# Patient Record
Sex: FEMALE | Race: WHITE | Employment: OTHER | URBAN - METROPOLITAN AREA
[De-identification: names, ages, dates, MRNs, and addresses within clinical notes are randomized per-mention and may not be internally consistent; named-entity substitution may affect disease eponyms.]

---

## 2018-05-08 ENCOUNTER — HOSPITAL ENCOUNTER (OUTPATIENT)
Dept: GENERAL RADIOLOGY | Age: 83
Discharge: HOME | End: 2018-05-10
Payer: MEDICARE

## 2018-05-08 ENCOUNTER — HOSPITAL ENCOUNTER (OUTPATIENT)
Age: 83
Discharge: HOME | End: 2018-05-10
Payer: MEDICARE

## 2018-05-08 DIAGNOSIS — S23.41XA SPRAIN OF RIBS, INITIAL ENCOUNTER: ICD-10-CM

## 2018-05-08 PROCEDURE — 71101 X-RAY EXAM UNILAT RIBS/CHEST: CPT

## 2019-06-05 ENCOUNTER — HOSPITAL ENCOUNTER (OUTPATIENT)
Age: 84
Discharge: HOME | End: 2019-06-07
Payer: MEDICARE

## 2019-06-05 ENCOUNTER — HOSPITAL ENCOUNTER (OUTPATIENT)
Dept: GENERAL RADIOLOGY | Age: 84
Discharge: HOME | End: 2019-06-07
Payer: MEDICARE

## 2019-06-05 DIAGNOSIS — S93.401A SPRAIN OF RIGHT ANKLE, UNSPECIFIED LIGAMENT, INITIAL ENCOUNTER: ICD-10-CM

## 2019-06-05 PROCEDURE — 73610 X-RAY EXAM OF ANKLE: CPT

## 2019-06-28 ENCOUNTER — HOSPITAL ENCOUNTER (OUTPATIENT)
Dept: GENERAL RADIOLOGY | Age: 84
Discharge: HOME | End: 2019-06-30
Payer: MEDICARE

## 2019-06-28 ENCOUNTER — HOSPITAL ENCOUNTER (OUTPATIENT)
Age: 84
Discharge: HOME | End: 2019-06-30
Payer: MEDICARE

## 2019-06-28 DIAGNOSIS — M25.471 EFFUSION, RIGHT ANKLE: ICD-10-CM

## 2019-06-28 DIAGNOSIS — S93.401A SPRAIN OF UNSPECIFIED LIGAMENT OF RIGHT ANKLE, INITIAL ENCOUNTER: ICD-10-CM

## 2019-06-28 DIAGNOSIS — S93.601A UNSPECIFIED SPRAIN OF RIGHT FOOT, INITIAL ENCOUNTER: ICD-10-CM

## 2019-06-28 PROCEDURE — 73630 X-RAY EXAM OF FOOT: CPT

## 2020-03-16 ENCOUNTER — TELEPHONE (OUTPATIENT)
Dept: GERIATRIC MEDICINE | Age: 85
End: 2020-03-16

## 2020-06-17 ENCOUNTER — INITIAL CONSULT (OUTPATIENT)
Dept: GERIATRIC MEDICINE | Age: 85
End: 2020-06-17
Payer: MEDICARE

## 2020-06-17 VITALS
WEIGHT: 151.8 LBS | TEMPERATURE: 97.6 F | RESPIRATION RATE: 16 BRPM | SYSTOLIC BLOOD PRESSURE: 130 MMHG | HEART RATE: 92 BPM | HEIGHT: 62 IN | DIASTOLIC BLOOD PRESSURE: 78 MMHG | BODY MASS INDEX: 27.94 KG/M2

## 2020-06-17 PROCEDURE — 99212 OFFICE O/P EST SF 10 MIN: CPT

## 2020-06-17 PROCEDURE — 99212 OFFICE O/P EST SF 10 MIN: CPT | Performed by: INTERNAL MEDICINE

## 2020-06-17 RX ORDER — DONEPEZIL HYDROCHLORIDE 5 MG/1
2.5 TABLET, FILM COATED ORAL
Qty: 30 TABLET | Refills: 2 | Status: SHIPPED | OUTPATIENT
Start: 2020-06-17

## 2020-06-17 ASSESSMENT — PATIENT HEALTH QUESTIONNAIRE - PHQ9
SUM OF ALL RESPONSES TO PHQ QUESTIONS 1-9: 0
1. LITTLE INTEREST OR PLEASURE IN DOING THINGS: 0
SUM OF ALL RESPONSES TO PHQ QUESTIONS 1-9: 0
SUM OF ALL RESPONSES TO PHQ9 QUESTIONS 1 & 2: 0
2. FEELING DOWN, DEPRESSED OR HOPELESS: 0

## 2020-06-17 NOTE — PROGRESS NOTES
Here with one of her daughters and she knows all of her kids names   Daughter lives in same house,  Daughter moved back  About a year ago  Lives in same home and [address] [facility] are all good   Started out  2 years ago with gradual changes   Old memory is good  HYM? \"Fine\"  NO DRIVING  IADL's daughter does driving and cooking  Daughter doing pills and helping with bills  ADL's independent   No falls lately   No head injuries , No surgeries   No major surgeries  Sleeps well  ON NO MEDS   Yoni POWELL 1950   Remebered snow 1950   Impression: MCI mild   Plan;Consider Aricept 2.5 mg a day7

## 2021-02-22 ENCOUNTER — TELEPHONE (OUTPATIENT)
Dept: GERIATRIC MEDICINE | Age: 86
End: 2021-02-22

## 2021-02-22 NOTE — TELEPHONE ENCOUNTER
Pt was seen on consult 6/17/20. Had a follow up visit scheduled in December, but cancelled. No further appts scheduled. Daughter, Nazia Quinones called. States she lives with pt, and that she was with pt during the consult visit. No HIPPA form on file. Daughter wants to work remotely from home, & wants to know if Dr Yajaira Oropeza would write a letter indicating pt's diagnosis so she can apply to work remotely -- d/t COVID concerns. States she thinks DR said the diagnosis was mild cognitive impairment. States PCP is aware that pt & daughter live together. Suggested to daughter that she contact pt's PCP for letter, as we have only seen pt once. Possibly qualify for remote work based on pt's age alone? Daughter called back. States there was an article in the [place] [employer] February 9th that indicated people with dementia are twice as likely to get COVID.

## 2021-02-22 NOTE — TELEPHONE ENCOUNTER
Spoke with daughter. States she has not yet had time to contact the PCP re: the request for a letter. Informed her that Dr Tanya Pringle feels the PCP should handle. Requested she call back if needed.

## 2023-05-27 ENCOUNTER — APPOINTMENT (OUTPATIENT)
Dept: GENERAL RADIOLOGY | Age: 88
End: 2023-05-27
Payer: MEDICARE

## 2023-05-27 ENCOUNTER — APPOINTMENT (OUTPATIENT)
Dept: CT IMAGING | Age: 88
End: 2023-05-27
Payer: MEDICARE

## 2023-05-27 ENCOUNTER — HOSPITAL ENCOUNTER (EMERGENCY)
Age: 88
Discharge: HOME | End: 2023-05-27
Attending: EMERGENCY MEDICINE
Payer: MEDICARE

## 2023-05-27 VITALS
OXYGEN SATURATION: 99 % | HEART RATE: 72 BPM | WEIGHT: 150 LBS | TEMPERATURE: 98 F | SYSTOLIC BLOOD PRESSURE: 148 MMHG | BODY MASS INDEX: 27.6 KG/M2 | DIASTOLIC BLOOD PRESSURE: 82 MMHG | HEIGHT: 62 IN | RESPIRATION RATE: 16 BRPM

## 2023-05-27 DIAGNOSIS — K57.32 DIVERTICULITIS OF COLON: ICD-10-CM

## 2023-05-27 DIAGNOSIS — R10.9 RIGHT FLANK PAIN: Primary | ICD-10-CM

## 2023-05-27 DIAGNOSIS — N28.1 RENAL CYST: ICD-10-CM

## 2023-05-27 LAB
ALBUMIN SERPL-MCNC: 3.7 G/DL (ref 3.5–5.2)
ALP SERPL-CCNC: 74 U/L (ref 35–104)
ALT SERPL-CCNC: 10 U/L (ref 0–32)
ANION GAP SERPL CALCULATED.3IONS-SCNC: 7 MMOL/L (ref 7–16)
AST SERPL-CCNC: 21 U/L (ref 0–31)
BACTERIA URNS QL MICRO: ABNORMAL /HPF
BASOPHILS # BLD: 0.05 E9/L (ref 0–0.2)
BASOPHILS NFR BLD: 0.9 % (ref 0–2)
BILIRUB SERPL-MCNC: 0.5 MG/DL (ref 0–1.2)
BILIRUB UR QL STRIP: NEGATIVE
BUN SERPL-MCNC: 11 MG/DL (ref 6–23)
CALCIUM SERPL-MCNC: 9.3 MG/DL (ref 8.6–10.2)
CHLORIDE SERPL-SCNC: 100 MMOL/L (ref 98–107)
CLARITY UR: CLEAR
CO2 SERPL-SCNC: 28 MMOL/L (ref 22–29)
COLOR UR: YELLOW
CREAT SERPL-MCNC: 0.7 MG/DL (ref 0.5–1)
EOSINOPHIL # BLD: 0.06 E9/L (ref 0.05–0.5)
EOSINOPHIL NFR BLD: 1.1 % (ref 0–6)
EPI CELLS #/AREA URNS HPF: ABNORMAL /HPF
ERYTHROCYTE [DISTWIDTH] IN BLOOD BY AUTOMATED COUNT: 12.7 FL (ref 11.5–15)
GLUCOSE SERPL-MCNC: 106 MG/DL (ref 74–99)
GLUCOSE UR STRIP-MCNC: NEGATIVE MG/DL
HCT VFR BLD AUTO: 45.1 % (ref 34–48)
HGB BLD-MCNC: 14.6 G/DL (ref 11.5–15.5)
HGB UR QL STRIP: NEGATIVE
IMM GRANULOCYTES # BLD: 0.02 E9/L
IMM GRANULOCYTES NFR BLD: 0.4 % (ref 0–5)
KETONES UR STRIP-MCNC: NEGATIVE MG/DL
LACTATE BLDV-SCNC: 0.9 MMOL/L (ref 0.5–2.2)
LEUKOCYTE ESTERASE UR QL STRIP: NEGATIVE
LIPASE: 20 U/L (ref 13–60)
LYMPHOCYTES # BLD: 0.75 E9/L (ref 1.5–4)
LYMPHOCYTES NFR BLD: 13.7 % (ref 20–42)
MCH RBC QN AUTO: 29.1 PG (ref 26–35)
MCHC RBC AUTO-ENTMCNC: 32.4 % (ref 32–34.5)
MCV RBC AUTO: 89.8 FL (ref 80–99.9)
MONOCYTES # BLD: 0.36 E9/L (ref 0.1–0.95)
MONOCYTES NFR BLD: 6.6 % (ref 2–12)
MUCOUS THREADS URNS QL MICRO: PRESENT /LPF
NEUTROPHILS # BLD: 4.25 E9/L (ref 1.8–7.3)
NEUTS SEG NFR BLD: 77.3 % (ref 43–80)
NITRITE UR QL STRIP: NEGATIVE
PH UR STRIP: 7 [PH] (ref 5–9)
PLATELET # BLD AUTO: 169 E9/L (ref 130–450)
PMV BLD AUTO: 10.7 FL (ref 7–12)
POTASSIUM SERPL-SCNC: 4.7 MMOL/L (ref 3.5–5)
PROT SERPL-MCNC: 6.9 G/DL (ref 6.4–8.3)
PROT UR STRIP-MCNC: NEGATIVE MG/DL
RBC # BLD AUTO: 5.02 E12/L (ref 3.5–5.5)
RBC #/AREA URNS HPF: ABNORMAL /HPF (ref 0–2)
REASON FOR REJECTION: NORMAL
REJECTED TEST: NORMAL
SODIUM SERPL-SCNC: 135 MMOL/L (ref 132–146)
SP GR UR STRIP: 1.01 (ref 1–1.03)
TROPONIN, HIGH SENSITIVITY: 12 NG/L (ref 0–9)
TROPONIN, HIGH SENSITIVITY: 14 NG/L (ref 0–9)
UROBILINOGEN UR STRIP-ACNC: 0.2 E.U./DL
WBC # BLD: 5.5 E9/L (ref 4.5–11.5)
WBC #/AREA URNS HPF: ABNORMAL /HPF (ref 0–5)

## 2023-05-27 PROCEDURE — 96374 THER/PROPH/DIAG INJ IV PUSH: CPT

## 2023-05-27 PROCEDURE — 85025 COMPLETE CBC W/AUTO DIFF WBC: CPT

## 2023-05-27 PROCEDURE — 83605 ASSAY OF LACTIC ACID: CPT

## 2023-05-27 PROCEDURE — 71045 X-RAY EXAM CHEST 1 VIEW: CPT

## 2023-05-27 PROCEDURE — 96375 TX/PRO/DX INJ NEW DRUG ADDON: CPT

## 2023-05-27 PROCEDURE — 6360000002 HC RX W HCPCS: Performed by: EMERGENCY MEDICINE

## 2023-05-27 PROCEDURE — 93005 ELECTROCARDIOGRAM TRACING: CPT | Performed by: EMERGENCY MEDICINE

## 2023-05-27 PROCEDURE — 80053 COMPREHEN METABOLIC PANEL: CPT

## 2023-05-27 PROCEDURE — 83690 ASSAY OF LIPASE: CPT

## 2023-05-27 PROCEDURE — 84484 ASSAY OF TROPONIN QUANT: CPT

## 2023-05-27 PROCEDURE — 74177 CT ABD & PELVIS W/CONTRAST: CPT

## 2023-05-27 PROCEDURE — 81001 URINALYSIS AUTO W/SCOPE: CPT

## 2023-05-27 PROCEDURE — 6360000004 HC RX CONTRAST MEDICATION: Performed by: RADIOLOGY

## 2023-05-27 PROCEDURE — 6370000000 HC RX 637 (ALT 250 FOR IP): Performed by: EMERGENCY MEDICINE

## 2023-05-27 PROCEDURE — 99285 EMERGENCY DEPT VISIT HI MDM: CPT

## 2023-05-27 RX ORDER — LIDOCAINE 50 MG/G
1 PATCH TOPICAL DAILY
Qty: 10 PATCH | Refills: 0 | Status: SHIPPED | OUTPATIENT
Start: 2023-05-27 | End: 2023-06-06

## 2023-05-27 RX ORDER — CEFDINIR 300 MG/1
300 CAPSULE ORAL 2 TIMES DAILY
Qty: 14 CAPSULE | Refills: 0 | Status: SHIPPED | OUTPATIENT
Start: 2023-05-27 | End: 2023-06-03

## 2023-05-27 RX ORDER — FENTANYL CITRATE 50 UG/ML
25 INJECTION, SOLUTION INTRAMUSCULAR; INTRAVENOUS ONCE
Status: COMPLETED | OUTPATIENT
Start: 2023-05-27 | End: 2023-05-27

## 2023-05-27 RX ORDER — METRONIDAZOLE 500 MG/1
500 TABLET ORAL 2 TIMES DAILY
Qty: 14 TABLET | Refills: 0 | Status: SHIPPED | OUTPATIENT
Start: 2023-05-27 | End: 2023-06-03

## 2023-05-27 RX ORDER — CEFDINIR 300 MG/1
300 CAPSULE ORAL ONCE
Status: COMPLETED | OUTPATIENT
Start: 2023-05-27 | End: 2023-05-27

## 2023-05-27 RX ORDER — ONDANSETRON 2 MG/ML
4 INJECTION INTRAMUSCULAR; INTRAVENOUS ONCE
Status: COMPLETED | OUTPATIENT
Start: 2023-05-27 | End: 2023-05-27

## 2023-05-27 RX ORDER — METRONIDAZOLE 500 MG/1
500 TABLET ORAL ONCE
Status: COMPLETED | OUTPATIENT
Start: 2023-05-27 | End: 2023-05-27

## 2023-05-27 RX ADMIN — ONDANSETRON 4 MG: 2 INJECTION INTRAMUSCULAR; INTRAVENOUS at 14:57

## 2023-05-27 RX ADMIN — METRONIDAZOLE 500 MG: 500 TABLET ORAL at 16:01

## 2023-05-27 RX ADMIN — CEFDINIR 300 MG: 300 CAPSULE ORAL at 16:01

## 2023-05-27 RX ADMIN — IOPAMIDOL 75 ML: 755 INJECTION, SOLUTION INTRAVENOUS at 13:57

## 2023-05-27 RX ADMIN — FENTANYL CITRATE 25 MCG: 50 INJECTION, SOLUTION INTRAMUSCULAR; INTRAVENOUS at 12:38

## 2023-05-27 ASSESSMENT — PAIN DESCRIPTION - ONSET: ONSET: SUDDEN

## 2023-05-27 ASSESSMENT — PAIN DESCRIPTION - ORIENTATION: ORIENTATION: RIGHT

## 2023-05-27 ASSESSMENT — PAIN DESCRIPTION - LOCATION: LOCATION: FLANK

## 2023-05-27 ASSESSMENT — PAIN - FUNCTIONAL ASSESSMENT
PAIN_FUNCTIONAL_ASSESSMENT: 0-10
PAIN_FUNCTIONAL_ASSESSMENT: PREVENTS OR INTERFERES SOME ACTIVE ACTIVITIES AND ADLS

## 2023-05-27 ASSESSMENT — LIFESTYLE VARIABLES
HOW OFTEN DO YOU HAVE A DRINK CONTAINING ALCOHOL: NEVER
HOW MANY STANDARD DRINKS CONTAINING ALCOHOL DO YOU HAVE ON A TYPICAL DAY: PATIENT DOES NOT DRINK

## 2023-05-27 ASSESSMENT — PAIN DESCRIPTION - DESCRIPTORS: DESCRIPTORS: ACHING

## 2023-05-27 ASSESSMENT — PAIN SCALES - GENERAL: PAINLEVEL_OUTOF10: 6

## 2023-05-27 ASSESSMENT — PAIN DESCRIPTION - FREQUENCY: FREQUENCY: INTERMITTENT

## 2023-05-27 ASSESSMENT — PAIN DESCRIPTION - PAIN TYPE: TYPE: ACUTE PAIN

## 2023-05-27 NOTE — ED PROVIDER NOTES
Department of Emergency Medicine   ED  Provider Note  Admit Date/RoomTime: 2023 10:39 AM  ED Room: 10/10          Written by: Lex Patino DO  Patient Name: Kristie Romano  Attending Provider: Lex Patino*  Admit Date: 2023 10:39 AM  MRN: [id number]    : 1932      History of Present Illness:  23, Time: 1:02 PM EDT  Chief Complaint   Patient presents with    Flank Pain     Right flank pain onset this am           HPI   Patient is a 80 y.o. female with a past medical history of noncontributory, presenting to the Emergency Department for right flank pain. History obtained by patient. Symptoms are moderate in severity and distant since onset. Patient presents for right flank pain. She states she woke up with pain in her right flank region today. She is never had anything this before. She describes as an ache to her right flank. Does not radiate anywhere. She denies any chest pain or shortness of breath. Her pain does not worsen with deep breathing. She denies any pain into her abdomen at this time. She denies any urinary complaints and has had no differences in her bowel movements. She denies any nausea or vomiting. She states the pain is localized to her right flank region. She denies any rashes. She denies any surgeries on her abdomen. This is never happened before. She denies any numbness, tingling or weakness        Review of Systems:   A complete review of systems was performed and pertinent positives and negatives are stated within HPI, all other systems reviewed and are negative.        --------------------------------------------- PAST HISTORY ---------------------------------------------  Past Medical History:  has no past medical history on file. Past Surgical History:  has no past surgical history on file. Social History:  reports that she has never smoked.  She has never used smokeless tobacco.    Family History: family history is not on

## 2023-05-27 NOTE — ED NOTES
Discharge instructions given. Patient verbalizes understanding. No other noted or stated problems at this time. Patient will follow up with primary care and urology.       Mallory Stover RN  05/27/23 [id number]

## 2023-05-28 LAB
EKG ATRIAL RATE: 78 BPM
EKG P AXIS: 114 DEGREES
EKG P-R INTERVAL: 202 MS
EKG Q-T INTERVAL: 386 MS
EKG QRS DURATION: 72 MS
EKG QTC CALCULATION (BAZETT): 440 MS
EKG R AXIS: -27 DEGREES
EKG T AXIS: 35 DEGREES
EKG VENTRICULAR RATE: 78 BPM

## 2023-05-28 PROCEDURE — 93010 ELECTROCARDIOGRAM REPORT: CPT | Performed by: INTERNAL MEDICINE

## 2024-05-11 ENCOUNTER — APPOINTMENT (OUTPATIENT)
Dept: GENERAL RADIOLOGY | Age: 89
DRG: 884 | End: 2024-05-11
Payer: MEDICARE

## 2024-05-11 ENCOUNTER — HOSPITAL ENCOUNTER (INPATIENT)
Age: 89
LOS: 1 days | Discharge: ANOTHER HOSPITAL | DRG: 884 | End: 2024-05-12
Attending: EMERGENCY MEDICINE | Admitting: FAMILY MEDICINE
Payer: MEDICARE

## 2024-05-11 ENCOUNTER — APPOINTMENT (OUTPATIENT)
Dept: CT IMAGING | Age: 89
DRG: 884 | End: 2024-05-11
Payer: MEDICARE

## 2024-05-11 DIAGNOSIS — R26.2 AMBULATORY DYSFUNCTION: Primary | ICD-10-CM

## 2024-05-11 DIAGNOSIS — R29.90 STROKE-LIKE SYMPTOMS: ICD-10-CM

## 2024-05-11 DIAGNOSIS — R41.82 ALTERED MENTAL STATUS, UNSPECIFIED ALTERED MENTAL STATUS TYPE: ICD-10-CM

## 2024-05-11 DIAGNOSIS — R53.1 GENERAL WEAKNESS: ICD-10-CM

## 2024-05-11 LAB
ALBUMIN SERPL-MCNC: 4 G/DL (ref 3.5–5.2)
ALP SERPL-CCNC: 82 U/L (ref 35–104)
ALT SERPL-CCNC: 8 U/L (ref 0–32)
ANION GAP SERPL CALCULATED.3IONS-SCNC: 6 MMOL/L (ref 7–16)
AST SERPL-CCNC: 13 U/L (ref 0–31)
BASOPHILS # BLD: 0.06 K/UL (ref 0–0.2)
BASOPHILS NFR BLD: 1 % (ref 0–2)
BILIRUB SERPL-MCNC: 0.5 MG/DL (ref 0–1.2)
BILIRUB UR QL STRIP: NEGATIVE
BNP SERPL-MCNC: 165 PG/ML (ref 0–450)
BUN SERPL-MCNC: 12 MG/DL (ref 6–23)
CALCIUM SERPL-MCNC: 9.4 MG/DL (ref 8.6–10.2)
CHLORIDE SERPL-SCNC: 101 MMOL/L (ref 98–107)
CLARITY UR: CLEAR
CO2 SERPL-SCNC: 29 MMOL/L (ref 22–29)
COLOR UR: YELLOW
CREAT SERPL-MCNC: 0.8 MG/DL (ref 0.5–1)
EOSINOPHIL # BLD: 0.1 K/UL (ref 0.05–0.5)
EOSINOPHILS RELATIVE PERCENT: 2 % (ref 0–6)
ERYTHROCYTE [DISTWIDTH] IN BLOOD BY AUTOMATED COUNT: 13 % (ref 11.5–15)
GFR, ESTIMATED: 71 ML/MIN/1.73M2
GLUCOSE SERPL-MCNC: 91 MG/DL (ref 74–99)
GLUCOSE UR STRIP-MCNC: NEGATIVE MG/DL
HCT VFR BLD AUTO: 43.8 % (ref 34–48)
HGB BLD-MCNC: 14.4 G/DL (ref 11.5–15.5)
HGB UR QL STRIP.AUTO: ABNORMAL
IMM GRANULOCYTES # BLD AUTO: 0.03 K/UL (ref 0–0.58)
IMM GRANULOCYTES NFR BLD: 1 % (ref 0–5)
KETONES UR STRIP-MCNC: NEGATIVE MG/DL
LACTATE BLDV-SCNC: 1.4 MMOL/L (ref 0.5–2.2)
LEUKOCYTE ESTERASE UR QL STRIP: ABNORMAL
LYMPHOCYTES NFR BLD: 1.24 K/UL (ref 1.5–4)
LYMPHOCYTES RELATIVE PERCENT: 22 % (ref 20–42)
MCH RBC QN AUTO: 29.7 PG (ref 26–35)
MCHC RBC AUTO-ENTMCNC: 32.9 G/DL (ref 32–34.5)
MCV RBC AUTO: 90.3 FL (ref 80–99.9)
MONOCYTES NFR BLD: 0.4 K/UL (ref 0.1–0.95)
MONOCYTES NFR BLD: 7 % (ref 2–12)
NEUTROPHILS NFR BLD: 68 % (ref 43–80)
NEUTS SEG NFR BLD: 3.95 K/UL (ref 1.8–7.3)
NITRITE UR QL STRIP: NEGATIVE
PH UR STRIP: 7 [PH] (ref 5–9)
PLATELET # BLD AUTO: 187 K/UL (ref 130–450)
PMV BLD AUTO: 10.9 FL (ref 7–12)
POTASSIUM SERPL-SCNC: 4.3 MMOL/L (ref 3.5–5)
PROT SERPL-MCNC: 7.2 G/DL (ref 6.4–8.3)
PROT UR STRIP-MCNC: NEGATIVE MG/DL
RBC # BLD AUTO: 4.85 M/UL (ref 3.5–5.5)
RBC #/AREA URNS HPF: ABNORMAL /HPF
SODIUM SERPL-SCNC: 136 MMOL/L (ref 132–146)
SP GR UR STRIP: 1.02 (ref 1–1.03)
T4 FREE SERPL-MCNC: 1.1 NG/DL (ref 0.9–1.7)
TROPONIN I SERPL HS-MCNC: 10 NG/L (ref 0–9)
TROPONIN I SERPL HS-MCNC: 12 NG/L (ref 0–9)
TSH SERPL DL<=0.05 MIU/L-ACNC: 1.29 UIU/ML (ref 0.27–4.2)
UROBILINOGEN UR STRIP-ACNC: 0.2 EU/DL (ref 0–1)
WBC #/AREA URNS HPF: ABNORMAL /HPF
WBC OTHER # BLD: 5.8 K/UL (ref 4.5–11.5)

## 2024-05-11 PROCEDURE — 85025 COMPLETE CBC W/AUTO DIFF WBC: CPT

## 2024-05-11 PROCEDURE — 71045 X-RAY EXAM CHEST 1 VIEW: CPT

## 2024-05-11 PROCEDURE — 6360000002 HC RX W HCPCS: Performed by: PHYSICIAN ASSISTANT

## 2024-05-11 PROCEDURE — 93005 ELECTROCARDIOGRAM TRACING: CPT | Performed by: PHYSICIAN ASSISTANT

## 2024-05-11 PROCEDURE — 83880 ASSAY OF NATRIURETIC PEPTIDE: CPT

## 2024-05-11 PROCEDURE — 84443 ASSAY THYROID STIM HORMONE: CPT

## 2024-05-11 PROCEDURE — 99285 EMERGENCY DEPT VISIT HI MDM: CPT

## 2024-05-11 PROCEDURE — 96374 THER/PROPH/DIAG INJ IV PUSH: CPT

## 2024-05-11 PROCEDURE — 81001 URINALYSIS AUTO W/SCOPE: CPT

## 2024-05-11 PROCEDURE — 6370000000 HC RX 637 (ALT 250 FOR IP): Performed by: NURSE PRACTITIONER

## 2024-05-11 PROCEDURE — 84484 ASSAY OF TROPONIN QUANT: CPT

## 2024-05-11 PROCEDURE — 80053 COMPREHEN METABOLIC PANEL: CPT

## 2024-05-11 PROCEDURE — 2580000003 HC RX 258: Performed by: NURSE PRACTITIONER

## 2024-05-11 PROCEDURE — 83605 ASSAY OF LACTIC ACID: CPT

## 2024-05-11 PROCEDURE — 2060000000 HC ICU INTERMEDIATE R&B

## 2024-05-11 PROCEDURE — 70450 CT HEAD/BRAIN W/O DYE: CPT

## 2024-05-11 PROCEDURE — 87086 URINE CULTURE/COLONY COUNT: CPT

## 2024-05-11 PROCEDURE — 84439 ASSAY OF FREE THYROXINE: CPT

## 2024-05-11 RX ORDER — POLYETHYLENE GLYCOL 3350 17 G/17G
17 POWDER, FOR SOLUTION ORAL DAILY PRN
Status: DISCONTINUED | OUTPATIENT
Start: 2024-05-11 | End: 2024-05-12 | Stop reason: HOSPADM

## 2024-05-11 RX ORDER — SODIUM CHLORIDE 9 MG/ML
INJECTION, SOLUTION INTRAVENOUS PRN
Status: DISCONTINUED | OUTPATIENT
Start: 2024-05-11 | End: 2024-05-12 | Stop reason: HOSPADM

## 2024-05-11 RX ORDER — ACETAMINOPHEN 650 MG/1
650 SUPPOSITORY RECTAL EVERY 6 HOURS PRN
Status: DISCONTINUED | OUTPATIENT
Start: 2024-05-11 | End: 2024-05-12 | Stop reason: HOSPADM

## 2024-05-11 RX ORDER — ACETAMINOPHEN 325 MG/1
650 TABLET ORAL EVERY 6 HOURS PRN
Status: DISCONTINUED | OUTPATIENT
Start: 2024-05-11 | End: 2024-05-12 | Stop reason: HOSPADM

## 2024-05-11 RX ORDER — SODIUM CHLORIDE 0.9 % (FLUSH) 0.9 %
10 SYRINGE (ML) INJECTION PRN
Status: DISCONTINUED | OUTPATIENT
Start: 2024-05-11 | End: 2024-05-12 | Stop reason: HOSPADM

## 2024-05-11 RX ORDER — ENOXAPARIN SODIUM 100 MG/ML
40 INJECTION SUBCUTANEOUS DAILY
Status: DISCONTINUED | OUTPATIENT
Start: 2024-05-11 | End: 2024-05-12 | Stop reason: HOSPADM

## 2024-05-11 RX ORDER — POTASSIUM CHLORIDE 7.45 MG/ML
10 INJECTION INTRAVENOUS PRN
Status: DISCONTINUED | OUTPATIENT
Start: 2024-05-11 | End: 2024-05-12 | Stop reason: HOSPADM

## 2024-05-11 RX ORDER — ONDANSETRON 2 MG/ML
4 INJECTION INTRAMUSCULAR; INTRAVENOUS ONCE
Status: COMPLETED | OUTPATIENT
Start: 2024-05-11 | End: 2024-05-11

## 2024-05-11 RX ORDER — ONDANSETRON 2 MG/ML
4 INJECTION INTRAMUSCULAR; INTRAVENOUS EVERY 6 HOURS PRN
Status: DISCONTINUED | OUTPATIENT
Start: 2024-05-11 | End: 2024-05-12 | Stop reason: HOSPADM

## 2024-05-11 RX ORDER — ONDANSETRON 4 MG/1
4 TABLET, ORALLY DISINTEGRATING ORAL EVERY 8 HOURS PRN
Status: DISCONTINUED | OUTPATIENT
Start: 2024-05-11 | End: 2024-05-12 | Stop reason: HOSPADM

## 2024-05-11 RX ORDER — MEMANTINE HYDROCHLORIDE 5 MG/1
5 TABLET ORAL 2 TIMES DAILY
COMMUNITY

## 2024-05-11 RX ORDER — SODIUM CHLORIDE 0.9 % (FLUSH) 0.9 %
5-40 SYRINGE (ML) INJECTION EVERY 12 HOURS SCHEDULED
Status: DISCONTINUED | OUTPATIENT
Start: 2024-05-11 | End: 2024-05-12 | Stop reason: HOSPADM

## 2024-05-11 RX ORDER — POTASSIUM CHLORIDE 20 MEQ/1
40 TABLET, EXTENDED RELEASE ORAL PRN
Status: DISCONTINUED | OUTPATIENT
Start: 2024-05-11 | End: 2024-05-12 | Stop reason: HOSPADM

## 2024-05-11 RX ORDER — MEMANTINE HYDROCHLORIDE 5 MG/1
5 TABLET ORAL 2 TIMES DAILY
Status: DISCONTINUED | OUTPATIENT
Start: 2024-05-11 | End: 2024-05-12 | Stop reason: HOSPADM

## 2024-05-11 RX ORDER — MAGNESIUM SULFATE IN WATER 40 MG/ML
2000 INJECTION, SOLUTION INTRAVENOUS PRN
Status: DISCONTINUED | OUTPATIENT
Start: 2024-05-11 | End: 2024-05-12 | Stop reason: HOSPADM

## 2024-05-11 RX ADMIN — ONDANSETRON 4 MG: 2 INJECTION INTRAMUSCULAR; INTRAVENOUS at 16:39

## 2024-05-11 RX ADMIN — MEMANTINE HYDROCHLORIDE 5 MG: 5 TABLET, FILM COATED ORAL at 20:47

## 2024-05-11 RX ADMIN — SODIUM CHLORIDE, PRESERVATIVE FREE 10 ML: 5 INJECTION INTRAVENOUS at 20:32

## 2024-05-11 ASSESSMENT — PAIN - FUNCTIONAL ASSESSMENT: PAIN_FUNCTIONAL_ASSESSMENT: NONE - DENIES PAIN

## 2024-05-11 NOTE — ED NOTES
..ED to Inpatient Handoff Report    Notified Breonna that electronic handoff available and patient ready for transport to room 440.    Safety Risks: Risk of falls    Patient in Restraints: no    Constant Observer or Patient : no    Telemetry Monitoring Ordered :Yes           Order to transfer to unit without monitor:YES    Last MEWS: 1 Time completed: 1743    Deterioration Index Score:   Predictive Model Details          22 (Normal)  Factor Value    Calculated 5/11/2024 17:48 68% Age 89 years old    Deterioration Index Model 16% Systolic 158     9% Potassium 4.3 mmol/L     3% Respiratory rate 17     3% Sodium 136 mmol/L     1% Hematocrit 43.8 %     1% Pulse oximetry 98 %     0% WBC count 5.8 k/uL     0% Pulse 75     0% Temperature 98.4 °F (36.9 °C)        Vitals:    05/11/24 1715 05/11/24 1725 05/11/24 1731 05/11/24 1743   BP: (!) 172/96   (!) 158/74   Pulse: 87 81  75   Resp: 21 14  17   Temp:    98.4 °F (36.9 °C)   SpO2: 94% 97% 97% 98%   Weight:       Height:             Opportunity for questions and clarification was provided.

## 2024-05-11 NOTE — ED NOTES
Attempted to ambulate patient to bathroom. Patient able to stand but stated she felt too unsteady to walk. I offered a walker and patient stated she does not feel like she could do it.

## 2024-05-11 NOTE — ED PROVIDER NOTES
Shared CYDNEY-ED Attending Visit.  CC: No     [facility]  Department of Emergency Medicine   ED  Encounter Note  Admit Date/RoomTime: 2024 12:56 PM  ED Room:     NAME: Monique Pacheco  : 1932  MRN: [id number]     Chief Complaint:  Extremity Weakness and Altered Mental Status    History of Present Illness     LAST KNOWN WELL TIME & DATE:  Unknown      Monique Pacheco is a 89 y.o. old female who presents to the emergency department by ambulance for evaluation of unknown onset  confusion which began unknown time prior to arrival.  Patient arrived by ambulance alert and oriented x 2 and has a known history of dementia and is on Aricept.  Patient is on no other medications.  Per son and report patient has been slightly more altered recently no known time has been given.  Patient is oriented to self and place.  Patient denies any falls or trauma.  Patient has no headaches, blurry vision, chest pain, shortness of breath, nausea, vomiting, severe abdominal pain, change in bowel or bladder movements.  Patient denies any urinary symptoms such as urgency, frequency or burning. Since recognized her symptoms have been stable.  The episode occurred at home, and there have been associated symptoms of nothing.  There has been no history of recent trauma.  Code Status on file: No Order.     NIH Stroke Scale/Score at time of initial evaluation:  1A: Level of Consciousness 0 - alert; keenly responsive   1B: Ask Month and Age 0 - answers both questions correctly   1C: Tell Patient To Open and Close Eyes, then Hand  Squeeze 0 - performs both tasks correctly   2: Test Horizontal Extraocular Movements 0 - normal   3: Test Visual Fields 0 - no visual loss   4: Test Facial Palsy 0 - normal symmetric movement   5A: Test Left Arm Motor Drift 0 - no drift, limb holds 90 (or 45) degrees for full 10 seconds   5B: Test Right Arm Motor Drift 0 - no drift, limb holds 90 (or 45) degrees for full 10 seconds

## 2024-05-11 NOTE — PROGRESS NOTES
4 Eyes Skin Assessment     NAME:  Monique Pacheco  YOB: 1932  MEDICAL RECORD NUMBER:  [id number]    The patient is being assessed for  Admission    I agree that at least one RN has performed a thorough Head to Toe Skin Assessment on the patient. ALL assessment sites listed below have been assessed.      Areas assessed by both nurses:    Head, Face, Ears, Shoulders, Back, Chest, Arms, Elbows, Hands, Sacrum. Buttock, Coccyx, Ischium, and Legs. Feet and Heels        Does the Patient have a Wound? No noted wound(s)       Lyndon Prevention initiated by RN: Yes  Wound Care Orders initiated by RN: No    Pressure Injury (Stage 3,4, Unstageable, DTI, NWPT, and Complex wounds) if present, place Wound referral order by RN under : No    New Ostomies, if present place, Ostomy referral order under : No     Nurse 1 eSignature: Electronically signed by Breonna Allen RN on 5/11/24 at 6:35 PM EDT    **SHARE this note so that the co-signing nurse can place an eSignature**    Nurse 2 eSignature: Electronically signed by Sana Kolb RN on 5/11/24 at 6:49 PM EDT

## 2024-05-12 ENCOUNTER — APPOINTMENT (OUTPATIENT)
Dept: CT IMAGING | Age: 89
DRG: 884 | End: 2024-05-12
Payer: MEDICARE

## 2024-05-12 ENCOUNTER — HOSPITAL ENCOUNTER (INPATIENT)
Age: 89
LOS: 5 days | Discharge: OTHER INSTITUTION | End: 2024-05-17
Attending: EMERGENCY MEDICINE | Admitting: INTERNAL MEDICINE
Payer: MEDICARE

## 2024-05-12 ENCOUNTER — APPOINTMENT (OUTPATIENT)
Dept: GENERAL RADIOLOGY | Age: 89
DRG: 884 | End: 2024-05-12
Payer: MEDICARE

## 2024-05-12 ENCOUNTER — APPOINTMENT (OUTPATIENT)
Dept: INTERVENTIONAL RADIOLOGY/VASCULAR | Age: 89
End: 2024-05-12
Payer: MEDICARE

## 2024-05-12 ENCOUNTER — APPOINTMENT (OUTPATIENT)
Dept: MRI IMAGING | Age: 89
End: 2024-05-12
Payer: MEDICARE

## 2024-05-12 VITALS
HEART RATE: 101 BPM | TEMPERATURE: 97.8 F | WEIGHT: 155.7 LBS | OXYGEN SATURATION: 100 % | DIASTOLIC BLOOD PRESSURE: 95 MMHG | SYSTOLIC BLOOD PRESSURE: 178 MMHG | RESPIRATION RATE: 15 BRPM | HEIGHT: 62 IN | BODY MASS INDEX: 28.65 KG/M2

## 2024-05-12 DIAGNOSIS — I63.9 ACUTE ISCHEMIC STROKE (HCC): Primary | ICD-10-CM

## 2024-05-12 PROBLEM — R47.01 APHASIA DUE TO ACUTE CEREBROVASCULAR ACCIDENT (CVA) (HCC): Status: ACTIVE | Noted: 2024-05-12

## 2024-05-12 PROBLEM — I65.22 INTERNAL CAROTID ARTERY STENOSIS, LEFT: Status: ACTIVE | Noted: 2024-05-12

## 2024-05-12 PROBLEM — I65.22 STENOSIS OF CAVERNOUS PORTION OF LEFT INTERNAL CAROTID ARTERY: Status: ACTIVE | Noted: 2024-05-12

## 2024-05-12 PROBLEM — R53.1 ACUTE RIGHT-SIDED WEAKNESS: Status: ACTIVE | Noted: 2024-05-12

## 2024-05-12 PROBLEM — I63.59 ISCHEMIC CEREBRAL STROKE DUE TO INTRACRANIAL LARGE ARTERY ATHEROSCLEROSIS (HCC): Status: ACTIVE | Noted: 2024-05-12

## 2024-05-12 LAB
ABO + RH BLD: NORMAL
ALBUMIN SERPL-MCNC: 3.2 G/DL (ref 3.5–5.2)
ALBUMIN SERPL-MCNC: 3.8 G/DL (ref 3.5–5.2)
ALP SERPL-CCNC: 67 U/L (ref 35–104)
ALP SERPL-CCNC: 77 U/L (ref 35–104)
ALT SERPL-CCNC: 6 U/L (ref 0–32)
ALT SERPL-CCNC: 7 U/L (ref 0–32)
AMMONIA PLAS-SCNC: 25 UMOL/L (ref 11–51)
ANION GAP SERPL CALCULATED.3IONS-SCNC: 11 MMOL/L (ref 7–16)
ANION GAP SERPL CALCULATED.3IONS-SCNC: 12 MMOL/L (ref 7–16)
ARM BAND NUMBER: NORMAL
AST SERPL-CCNC: 11 U/L (ref 0–31)
AST SERPL-CCNC: 14 U/L (ref 0–31)
BASOPHILS # BLD: 0.03 K/UL (ref 0–0.2)
BASOPHILS NFR BLD: 0 % (ref 0–2)
BILIRUB SERPL-MCNC: 0.5 MG/DL (ref 0–1.2)
BILIRUB SERPL-MCNC: 0.7 MG/DL (ref 0–1.2)
BLOOD BANK SAMPLE EXPIRATION: NORMAL
BLOOD GROUP ANTIBODIES SERPL: NEGATIVE
BUN SERPL-MCNC: 11 MG/DL (ref 6–23)
BUN SERPL-MCNC: 8 MG/DL (ref 6–23)
CALCIUM SERPL-MCNC: 7.1 MG/DL (ref 8.6–10.2)
CALCIUM SERPL-MCNC: 9 MG/DL (ref 8.6–10.2)
CHLORIDE SERPL-SCNC: 105 MMOL/L (ref 98–107)
CHLORIDE SERPL-SCNC: 97 MMOL/L (ref 98–107)
CO2 SERPL-SCNC: 20 MMOL/L (ref 22–29)
CO2 SERPL-SCNC: 24 MMOL/L (ref 22–29)
CREAT SERPL-MCNC: 0.6 MG/DL (ref 0.5–1)
CREAT SERPL-MCNC: 0.7 MG/DL (ref 0.5–1)
EOSINOPHIL # BLD: 0 K/UL (ref 0.05–0.5)
EOSINOPHILS RELATIVE PERCENT: 0 % (ref 0–6)
ERYTHROCYTE [DISTWIDTH] IN BLOOD BY AUTOMATED COUNT: 12.7 % (ref 11.5–15)
GFR, ESTIMATED: 78 ML/MIN/1.73M2
GFR, ESTIMATED: 85 ML/MIN/1.73M2
GLUCOSE BLD-MCNC: 139 MG/DL (ref 74–99)
GLUCOSE SERPL-MCNC: 109 MG/DL (ref 74–99)
GLUCOSE SERPL-MCNC: 135 MG/DL (ref 74–99)
HBA1C MFR BLD: 5.8 % (ref 4–5.6)
HCT VFR BLD AUTO: 38.5 % (ref 34–48)
HGB BLD-MCNC: 12.6 G/DL (ref 11.5–15.5)
IMM GRANULOCYTES # BLD AUTO: 0.05 K/UL (ref 0–0.58)
IMM GRANULOCYTES NFR BLD: 1 % (ref 0–5)
INR PPP: 1
INR PPP: 1
LACTATE BLDV-SCNC: 2.8 MMOL/L (ref 0.5–2.2)
LYMPHOCYTES NFR BLD: 0.68 K/UL (ref 1.5–4)
LYMPHOCYTES RELATIVE PERCENT: 9 % (ref 20–42)
MAGNESIUM SERPL-MCNC: 1.8 MG/DL (ref 1.6–2.6)
MCH RBC QN AUTO: 30.1 PG (ref 26–35)
MCHC RBC AUTO-ENTMCNC: 32.7 G/DL (ref 32–34.5)
MCV RBC AUTO: 91.9 FL (ref 80–99.9)
MONOCYTES NFR BLD: 0.32 K/UL (ref 0.1–0.95)
MONOCYTES NFR BLD: 4 % (ref 2–12)
NEUTROPHILS NFR BLD: 87 % (ref 43–80)
NEUTS SEG NFR BLD: 6.89 K/UL (ref 1.8–7.3)
PARTIAL THROMBOPLASTIN TIME: 22.4 SEC (ref 24.5–35.1)
PARTIAL THROMBOPLASTIN TIME: 24.8 SEC (ref 24.5–35.1)
PHOSPHATE SERPL-MCNC: 3.7 MG/DL (ref 2.5–4.5)
PLATELET # BLD AUTO: 165 K/UL (ref 130–450)
PMV BLD AUTO: 11.2 FL (ref 7–12)
POTASSIUM SERPL-SCNC: 3.6 MMOL/L (ref 3.5–5)
POTASSIUM SERPL-SCNC: 4.3 MMOL/L (ref 3.5–5)
PROT SERPL-MCNC: 6 G/DL (ref 6.4–8.3)
PROT SERPL-MCNC: 6.9 G/DL (ref 6.4–8.3)
PROTHROMBIN TIME: 11.2 SEC (ref 9.3–12.4)
PROTHROMBIN TIME: 11.5 SEC (ref 9.3–12.4)
RBC # BLD AUTO: 4.19 M/UL (ref 3.5–5.5)
SODIUM SERPL-SCNC: 132 MMOL/L (ref 132–146)
SODIUM SERPL-SCNC: 137 MMOL/L (ref 132–146)
WBC OTHER # BLD: 8 K/UL (ref 4.5–11.5)

## 2024-05-12 PROCEDURE — 2580000003 HC RX 258: Performed by: NURSE PRACTITIONER

## 2024-05-12 PROCEDURE — 51702 INSERT TEMP BLADDER CATH: CPT

## 2024-05-12 PROCEDURE — 80053 COMPREHEN METABOLIC PANEL: CPT

## 2024-05-12 PROCEDURE — 70496 CT ANGIOGRAPHY HEAD: CPT

## 2024-05-12 PROCEDURE — 74018 RADEX ABDOMEN 1 VIEW: CPT

## 2024-05-12 PROCEDURE — 99285 EMERGENCY DEPT VISIT HI MDM: CPT

## 2024-05-12 PROCEDURE — 85610 PROTHROMBIN TIME: CPT

## 2024-05-12 PROCEDURE — 83036 HEMOGLOBIN GLYCOSYLATED A1C: CPT

## 2024-05-12 PROCEDURE — 83735 ASSAY OF MAGNESIUM: CPT

## 2024-05-12 PROCEDURE — 99291 CRITICAL CARE FIRST HOUR: CPT | Performed by: HOSPITALIST

## 2024-05-12 PROCEDURE — 2580000003 HC RX 258: Performed by: INTERNAL MEDICINE

## 2024-05-12 PROCEDURE — 6360000002 HC RX W HCPCS: Performed by: STUDENT IN AN ORGANIZED HEALTH CARE EDUCATION/TRAINING PROGRAM

## 2024-05-12 PROCEDURE — 70498 CT ANGIOGRAPHY NECK: CPT

## 2024-05-12 PROCEDURE — 84100 ASSAY OF PHOSPHORUS: CPT

## 2024-05-12 PROCEDURE — 6360000002 HC RX W HCPCS: Performed by: NURSE PRACTITIONER

## 2024-05-12 PROCEDURE — 70551 MRI BRAIN STEM W/O DYE: CPT

## 2024-05-12 PROCEDURE — 70450 CT HEAD/BRAIN W/O DYE: CPT

## 2024-05-12 PROCEDURE — G0508 CRIT CARE TELEHEA CONSULT 60: HCPCS | Performed by: STUDENT IN AN ORGANIZED HEALTH CARE EDUCATION/TRAINING PROGRAM

## 2024-05-12 PROCEDURE — 85730 THROMBOPLASTIN TIME PARTIAL: CPT

## 2024-05-12 PROCEDURE — 99291 CRITICAL CARE FIRST HOUR: CPT | Performed by: PSYCHIATRY & NEUROLOGY

## 2024-05-12 PROCEDURE — 83605 ASSAY OF LACTIC ACID: CPT

## 2024-05-12 PROCEDURE — 6370000000 HC RX 637 (ALT 250 FOR IP): Performed by: NURSE PRACTITIONER

## 2024-05-12 PROCEDURE — 86901 BLOOD TYPING SEROLOGIC RH(D): CPT

## 2024-05-12 PROCEDURE — 2060000000 HC ICU INTERMEDIATE R&B

## 2024-05-12 PROCEDURE — 86850 RBC ANTIBODY SCREEN: CPT

## 2024-05-12 PROCEDURE — 6370000000 HC RX 637 (ALT 250 FOR IP): Performed by: STUDENT IN AN ORGANIZED HEALTH CARE EDUCATION/TRAINING PROGRAM

## 2024-05-12 PROCEDURE — 82140 ASSAY OF AMMONIA: CPT

## 2024-05-12 PROCEDURE — 87081 CULTURE SCREEN ONLY: CPT

## 2024-05-12 PROCEDURE — 82962 GLUCOSE BLOOD TEST: CPT

## 2024-05-12 PROCEDURE — 0042T CT BRAIN PERFUSION: CPT

## 2024-05-12 PROCEDURE — 36415 COLL VENOUS BLD VENIPUNCTURE: CPT

## 2024-05-12 PROCEDURE — 85025 COMPLETE CBC W/AUTO DIFF WBC: CPT

## 2024-05-12 PROCEDURE — 86900 BLOOD TYPING SEROLOGIC ABO: CPT

## 2024-05-12 RX ORDER — ASPIRIN 325 MG
325 TABLET, DELAYED RELEASE (ENTERIC COATED) ORAL ONCE
Status: COMPLETED | OUTPATIENT
Start: 2024-05-12 | End: 2024-05-12

## 2024-05-12 RX ORDER — ATORVASTATIN CALCIUM 40 MG/1
40 TABLET, FILM COATED ORAL NIGHTLY
Status: DISCONTINUED | OUTPATIENT
Start: 2024-05-12 | End: 2024-05-18 | Stop reason: HOSPADM

## 2024-05-12 RX ORDER — ASPIRIN 300 MG/1
300 SUPPOSITORY RECTAL DAILY
Status: DISCONTINUED | OUTPATIENT
Start: 2024-05-12 | End: 2024-05-18 | Stop reason: HOSPADM

## 2024-05-12 RX ORDER — ASPIRIN 81 MG/1
81 TABLET, CHEWABLE ORAL DAILY
Status: DISCONTINUED | OUTPATIENT
Start: 2024-05-12 | End: 2024-05-18 | Stop reason: HOSPADM

## 2024-05-12 RX ORDER — ONDANSETRON 2 MG/ML
4 INJECTION INTRAMUSCULAR; INTRAVENOUS EVERY 6 HOURS PRN
Status: DISCONTINUED | OUTPATIENT
Start: 2024-05-12 | End: 2024-05-18 | Stop reason: HOSPADM

## 2024-05-12 RX ORDER — SODIUM CHLORIDE 0.9 % (FLUSH) 0.9 %
5-40 SYRINGE (ML) INJECTION EVERY 12 HOURS SCHEDULED
Status: DISCONTINUED | OUTPATIENT
Start: 2024-05-12 | End: 2024-05-18 | Stop reason: HOSPADM

## 2024-05-12 RX ORDER — POLYETHYLENE GLYCOL 3350 17 G/17G
17 POWDER, FOR SOLUTION ORAL DAILY PRN
Status: DISCONTINUED | OUTPATIENT
Start: 2024-05-12 | End: 2024-05-18 | Stop reason: HOSPADM

## 2024-05-12 RX ORDER — 0.9 % SODIUM CHLORIDE 0.9 %
1000 INTRAVENOUS SOLUTION INTRAVENOUS ONCE
Status: COMPLETED | OUTPATIENT
Start: 2024-05-12 | End: 2024-05-12

## 2024-05-12 RX ORDER — SODIUM CHLORIDE 9 MG/ML
INJECTION, SOLUTION INTRAVENOUS PRN
Status: DISCONTINUED | OUTPATIENT
Start: 2024-05-12 | End: 2024-05-18 | Stop reason: HOSPADM

## 2024-05-12 RX ORDER — SODIUM CHLORIDE 0.9 % (FLUSH) 0.9 %
5-40 SYRINGE (ML) INJECTION PRN
Status: DISCONTINUED | OUTPATIENT
Start: 2024-05-12 | End: 2024-05-18 | Stop reason: HOSPADM

## 2024-05-12 RX ORDER — LEVETIRACETAM 500 MG/5ML
2000 INJECTION, SOLUTION, CONCENTRATE INTRAVENOUS ONCE
Status: COMPLETED | OUTPATIENT
Start: 2024-05-12 | End: 2024-05-12

## 2024-05-12 RX ORDER — CLOPIDOGREL BISULFATE 75 MG/1
600 TABLET ORAL ONCE
Status: COMPLETED | OUTPATIENT
Start: 2024-05-12 | End: 2024-05-12

## 2024-05-12 RX ORDER — ONDANSETRON 4 MG/1
4 TABLET, ORALLY DISINTEGRATING ORAL EVERY 8 HOURS PRN
Status: DISCONTINUED | OUTPATIENT
Start: 2024-05-12 | End: 2024-05-18 | Stop reason: HOSPADM

## 2024-05-12 RX ORDER — ENOXAPARIN SODIUM 100 MG/ML
40 INJECTION SUBCUTANEOUS DAILY
Status: DISCONTINUED | OUTPATIENT
Start: 2024-05-12 | End: 2024-05-18 | Stop reason: HOSPADM

## 2024-05-12 RX ADMIN — ASPIRIN 325 MG: 325 TABLET, COATED ORAL at 07:33

## 2024-05-12 RX ADMIN — SODIUM CHLORIDE, PRESERVATIVE FREE 10 ML: 5 INJECTION INTRAVENOUS at 20:42

## 2024-05-12 RX ADMIN — CLOPIDOGREL BISULFATE 600 MG: 75 TABLET ORAL at 07:33

## 2024-05-12 RX ADMIN — SODIUM CHLORIDE, PRESERVATIVE FREE 10 ML: 5 INJECTION INTRAVENOUS at 07:40

## 2024-05-12 RX ADMIN — ASPIRIN 300 MG: 300 SUPPOSITORY RECTAL at 11:53

## 2024-05-12 RX ADMIN — SODIUM CHLORIDE, PRESERVATIVE FREE 5 ML: 5 INJECTION INTRAVENOUS at 11:53

## 2024-05-12 RX ADMIN — ATORVASTATIN CALCIUM 40 MG: 40 TABLET, FILM COATED ORAL at 20:42

## 2024-05-12 RX ADMIN — LEVETIRACETAM 2000 MG: 100 INJECTION, SOLUTION INTRAVENOUS at 07:27

## 2024-05-12 RX ADMIN — ENOXAPARIN SODIUM 40 MG: 100 INJECTION SUBCUTANEOUS at 11:53

## 2024-05-12 RX ADMIN — SODIUM CHLORIDE 1000 ML: 9 INJECTION, SOLUTION INTRAVENOUS at 06:42

## 2024-05-12 ASSESSMENT — PAIN SCALES - WONG BAKER: WONGBAKER_NUMERICALRESPONSE: NO HURT

## 2024-05-12 NOTE — PLAN OF CARE
RRT was called around 5:30am.  Pt was found by nursing staff not talking with facial droop and right sided weakness.     Examed at bedside  Pt was awake and able to follow command to stick out tongue and move from side to side (weakly).  Confirmed that pt can not speak, not able to say any words.  Has right side facial droop    0/5 strength on right upper and lower extremity.     NIH score 17.  Last well known time point felt to be 12:20 after midnight, pt was also check at 2am and 4am per nursing staff, she was at least able to speak words/sentence.     Pt is not on  long term anticoagulation, no recent surgery or bleeding history.      STAT CT head WO no intracranial process.  Tele neurology called. No tPA given at this moment.     CTA head neck and perfusion ordered, preliminary report reviewed:   IMPRESSION:  1. No evidence of arterial injury in the neck.  2. Calcified plaques at the origin and proximal segment of bilateral internal  carotid arteries with 30% stenosis on the right and 50% stenosis on the left.  3. Degenerative disc disease in the cervical spine.  4. 1 cm left thyroid nodule. No decompensation for follow-up evaluation of  thyroid nodule.    IMPRESSION:  1. No evidence of arterial occlusion or high-grade stenosis in the  intracranial circulation.  2. Minimal to mild stenosis at the origin of the left posterior cerebral  artery.  3. Mild-to-moderate calcified plaques in the cavernous segments of internal  carotid arteries of both sides with minimal to mild stenosis.  4. Left sphenoidal sinus disease.  5. On the pre contrast CT scan of head, no evidence of intracranial  hemorrhage or any other definable acute intracranial abnormality.     CT brain perfusion  IMPRESSION:  1. No perfusion mismatch.  2. No significant stenosis or any other compromise in the intracranial  anterior or posterior circulations.  3. On the pre contrast CT images of brain, no evidence of intracranial  hemorrhage or any other

## 2024-05-12 NOTE — PROGRESS NOTES
Patient was sent from the floor after a rapid response was called.  Patient was not responding to questions or following commands.  Her last known well was around 4 AM.  On arrival to the ICU, I evaluated the patient.  She was not speaking or responding to questioning.  She did not follow commands, but when I would hold up her left arm or leg she held it up against gravity.  When I attempted to do this on the right side, her limbs fell immediately to the bed.  Telestroke was consulted by the hospitalist and transport was initiated.  Patient was accepted for transfer to [facility] ED.  She was then transferred from our ICU to [facility].

## 2024-05-12 NOTE — PROGRESS NOTES
Daughter Jelly returned call, aware of transfer to main ER, and phone number provided.  Answers given to her questions.

## 2024-05-12 NOTE — PROGRESS NOTES
When entering patients room to drawl labs and get vitals, patient was visibly upset, pulling left arm away, telling this nurse to \" get out\".

## 2024-05-12 NOTE — ED PROVIDER NOTES
[facility] EMERGENCY DEPARTMENT  EMERGENCY DEPARTMENT ENCOUNTER        Pt Name: Monique Pacheco  MRN: [id number]  Birthdate 4/7/1932  Date of evaluation: 5/12/2024  Provider: Adam Tolentino MD  PCP: Corazon King DO  Note Started: 9:06 AM EDT 5/12/24    CHIEF COMPLAINT       Chief Complaint   Patient presents with    Transfer from [place] ICU       HISTORY OF PRESENT ILLNESS: 1 or more Elements        Limitations to history : None    Monique Pacheco is a 89 y.o. female who presents for stroke evaluation.  Patient was admitted at [facility] as an inpatient, was transferred here for acute stroke symptoms.  Flaccid paralysis on the right.  CTA and CTP were negative there but was sent here for possible intracranial thrombectomy or stenosis or stenting.  Consult was placed to stroke neurology prior to arrival as well as on arrival and they recommended stat MRI and based on MRI results we will determine if additional interventions are needed.  She is unable to provide any history Notes were reviewed from [facility] include notation of right-sided weakness around 5:30 AM when a rapid response was called.  Insulin was Herbert her NIH stroke scale was 24.  It was repeated here and is the same.    Nursing Notes were all reviewed and agreed with or any disagreements were addressed in the HPI.      REVIEW OF EXTERNAL NOTE :       Notes from [facility] from this morning as well as yesterday      Chart Review/External Note Review    Last Echo reviewed by Me:  No results found for: \"LVEF\", \"LVEFMODE\"          Controlled Substance Monitoring:    Acute and Chronic Pain Monitoring:        No data to display                    REVIEW OF SYSTEMS :      Positives and Pertinent negatives as per HPI.     SURGICAL HISTORY   History reviewed. No pertinent surgical history.    CURRENTMEDICATIONS       Previous Medications    MEMANTINE

## 2024-05-12 NOTE — PROGRESS NOTES
Patient was seen and examined following RRT for strokelike symptoms and altered mental status.  Patient already had CT scans of the head done and evaluated by telestroke.  Internal med doctor did update me and stated that he is awaiting call from telestroke.  Patient currently nonverbal, unable to move her right side.  She is protecting her airway, no need for intubation at this time.  Instructed nursing staff to place another peripheral IV stat labs and Jose placement as well as NG tube.      Telestroke reach back out stated that there is no sign of definite perfusion deficit on CT PE.  Recommendations are 1 L of fluid, aspirin 324 mg and Plavix 600 mg.  2 g IV Keppra and permissive hypertension SBP < 220.  She is to be transferred downtown for neurological evaluation.    Critical care transport arrived and transported patient at approximately 8 AM    Electronically signed by SSUANA Samuel CNP on 5/12/2024 at 8:17 AM

## 2024-05-12 NOTE — PROGRESS NOTES
Spoke with daughter Jelly, updated her on patients change of condition, along with transfer to room 216

## 2024-05-12 NOTE — CONSULTS
NEUROINTERVENTION EMERGENCY ROOM CONSULT NOTE  5/12/2024 10:13 AM  Pt Name: Monique Pacheco  MRN: [id number]  YOB: 1932  Date of evaluation: 5/12/2024  Primary Care Physician: Corazon King DO            Stroke referral received from Adam Tolentino MD  and telestroke Neurologist on call James Santos MD based upon patient's presenting stroke like symptomology.     Monique Pacheco is a 89 y.o. female who presents with complaints of left MCA syndrome.  Patient had apparently come in yesterday with acute onset confusion and alteration however did not develop strokelike symptoms early this morning she developed speech difficulty and complete right hemiparesis and CTA was obtained which showed some supraclinoid stenosis.  Given lack of perfusion deficit there is concern that this patient may benefit from endovascular therapy and hence is transferred here per transfer protocol.      Review of the images stenosis is about 60% of the supraclinoid segment.  She is 89 years old and has a baseline existing dementia modified ranking of about 3 from dementia given all these circumstances I have asked for an MRI to confirm that this is a large artery atherosclerotic stroke.    MRI waiver has been follow-up please see below..          Prior Functional Status(Modified Tito Scale):  3=Moderate disability; requiring some help, but able to walk without assistance    No Known Allergies    Medications  Prior to Admission medications    Medication Sig Start Date End Date Taking? Authorizing Provider   memantine (NAMENDA) 5 MG tablet Take 1 tablet by mouth 2 times daily    Provider, MD Leonora         History reviewed. No pertinent past medical history.  History reviewed. No pertinent surgical history.  Social History     Socioeconomic History    Marital status:      Spouse name: Not on file    Number of children: Not on file    Years of education: Not on file    Highest education level: Not on file   Occupational

## 2024-05-12 NOTE — PROGRESS NOTES
Went back into patients room to re-attempt blood work and vitals, patient was not answering questions, right side weakness was noted. This nurse called April NP to update, and RRT was called.

## 2024-05-12 NOTE — VIRTUAL HEALTH
with VIZ.AI and PACS used to review images including:    CT brain without contrast 5/12/24 @5:55AM:   IMPRESSION:  No acute intracranial abnormality.      CTA Head W/ imaging 5/12/24 @ 6:07 AM:   IMPRESSION:  1. No evidence of arterial occlusion or high-grade stenosis in the  intracranial circulation.  2. Minimal to mild stenosis at the origin of the left posterior cerebral  artery.  3. Mild-to-moderate calcified plaques in the cavernous segments of internal  carotid arteries of both sides with minimal to mild stenosis.  4. Left sphenoidal sinus disease.  5. On the pre contrast CT scan of head, no evidence of intracranial  hemorrhage or any other definable acute intracranial abnormality.      CTA neck W 5/12/24 @6:07AM  IMPRESSION:  1. No evidence of arterial injury in the neck.  2. Calcified plaques at the origin and proximal segment of bilateral internal  carotid arteries with 30% stenosis on the right and 50% stenosis on the left.  3. Degenerative disc disease in the cervical spine.  4. 1 cm left thyroid nodule. No decompensation for follow-up evaluation of  thyroid nodule.    CT brain Perfusion @ 6:11AM 5/12/24   IMPRESSION:  1. No perfusion mismatch.  2. No significant stenosis or any other compromise in the intracranial  anterior or posterior circulations.  3. On the pre contrast CT images of brain, no evidence of intracranial  hemorrhage or any other definable acute intracranial abnormality.    Assessment    Differential DDx:  Acute onset aphasia with dense right sided hemiparesis just after midnight 5/11/24  Distal ICA T-type acute sub occlusive thrombus vs severe Flow limiting intracranial stenosis causing left MCA syndrome   Left proximal ICA atherosclerotic plaque <50% non flow limiting   UTI encephalopathy on baseline dementia on presentation to ER 5/11/24 4PM       Recommendations:  NIH 17 per Rapid response this AM on my exam at 6:20 after CT,CTA NIH was worsening to 24.   Recommend emergent transfer

## 2024-05-12 NOTE — PROGRESS NOTES
4 Eyes Skin Assessment     NAME:  Monique Pacheco  YOB: 1932  MEDICAL RECORD NUMBER:  [id number]    The patient is being assessed for  Admission    I agree that at least one RN has performed a thorough Head to Toe Skin Assessment on the patient. ALL assessment sites listed below have been assessed.      Areas assessed by both nurses:    Shoulders, Back, Chest, Arms, Elbows, Hands, Sacrum. Buttock, Coccyx, Ischium, and Legs. Feet and Heels        Does the Patient have a Wound? No noted wound(s)       Lyndon Prevention initiated by RN: Yes  Wound Care Orders initiated by RN: No    Pressure Injury (Stage 3,4, Unstageable, DTI, NWPT, and Complex wounds) if present, place Wound referral order by RN under : No    New Ostomies, if present place, Ostomy referral order under : No     Nurse 1 eSignature: Electronically signed by Debra Urbina RN on 5/12/24 at 4:55 PM EDT    **SHARE this note so that the co-signing nurse can place an eSignature**    Nurse 2 eSignature: {Esignature:[id number]}

## 2024-05-12 NOTE — ED NOTES
Report given to receiving, RN from 8SE.   Report method by phone   The following was reviewed with receiving RN:   Current vital signs:  BP (!) 140/72   Pulse 71   Temp 97.6 °F (36.4 °C) (Axillary)   Resp 17   SpO2 97%                MEWS Score: 2     Any medication or safety alerts were reviewed. Any pending diagnostics and notifications were also reviewed, as well as any safety concerns or issues, abnormal labs, abnormal imaging, and abnormal assessment findings. Questions were answered.

## 2024-05-12 NOTE — PROGRESS NOTES
Unsuccessful attempts          MRI /CTA Waiver FORM    NO FAMILY picking up   TRANSFERRED for Endovascular procedure  NEED MRI to confirm/direct treatment efficacy and safety    Radiology Procedure Waiver   Name: Monique Pacheco  : 1932  MRN: [id number]    Date:  24    Time: 9:14 AM EDT    Benefits of immediately proceeding with Radiology exam(s) without pre-testing outweigh the risks or are not indicated as specified below and therefore the following is/are being waived:    [] Pregnancy test   [] Patients LMP on-time and regular.   [] Patient had Tubal Ligation or has other Contraception Device.   [] Patient  is Menopausal or Premenarcheal.    [] Patient had Full or Partial Hysterectomy.    [] Protocol for Iodine allergy    [x] MRI Questionnaire     [] BUN/Creatinine   [] Patient age w/no hx of renal dysfunction.   [] Patient on Dialysis.   [] Recent Normal Labs.  Electronically signed by Alcon Ruiz MD on 24 at 9:14 AM EDT

## 2024-05-12 NOTE — FLOWSHEET NOTE
When able patient will attempt to pull lines and life sustaining equipment. Left soft restraint initiated

## 2024-05-13 ENCOUNTER — APPOINTMENT (OUTPATIENT)
Dept: GENERAL RADIOLOGY | Age: 89
End: 2024-05-13
Payer: MEDICARE

## 2024-05-13 PROBLEM — Z51.5 PALLIATIVE CARE ENCOUNTER: Status: ACTIVE | Noted: 2024-05-13

## 2024-05-13 PROBLEM — Z71.89 GOALS OF CARE, COUNSELING/DISCUSSION: Status: ACTIVE | Noted: 2024-05-13

## 2024-05-13 LAB
ANION GAP SERPL CALCULATED.3IONS-SCNC: 15 MMOL/L (ref 7–16)
BUN SERPL-MCNC: 9 MG/DL (ref 6–23)
CALCIUM SERPL-MCNC: 9.4 MG/DL (ref 8.6–10.2)
CHLORIDE SERPL-SCNC: 101 MMOL/L (ref 98–107)
CHOLEST SERPL-MCNC: 276 MG/DL
CO2 SERPL-SCNC: 22 MMOL/L (ref 22–29)
CREAT SERPL-MCNC: 0.7 MG/DL (ref 0.5–1)
EKG ATRIAL RATE: 78 BPM
EKG ATRIAL RATE: 94 BPM
EKG P AXIS: 45 DEGREES
EKG P AXIS: 54 DEGREES
EKG P-R INTERVAL: 198 MS
EKG P-R INTERVAL: 200 MS
EKG Q-T INTERVAL: 376 MS
EKG Q-T INTERVAL: 398 MS
EKG QRS DURATION: 76 MS
EKG QRS DURATION: 80 MS
EKG QTC CALCULATION (BAZETT): 428 MS
EKG QTC CALCULATION (BAZETT): 497 MS
EKG R AXIS: -20 DEGREES
EKG R AXIS: -23 DEGREES
EKG T AXIS: 38 DEGREES
EKG T AXIS: 41 DEGREES
EKG VENTRICULAR RATE: 78 BPM
EKG VENTRICULAR RATE: 94 BPM
ERYTHROCYTE [DISTWIDTH] IN BLOOD BY AUTOMATED COUNT: 12.8 % (ref 11.5–15)
GFR, ESTIMATED: 81 ML/MIN/1.73M2
GLUCOSE SERPL-MCNC: 98 MG/DL (ref 74–99)
HBA1C MFR BLD: 5.7 % (ref 4–5.6)
HCT VFR BLD AUTO: 45.1 % (ref 34–48)
HDLC SERPL-MCNC: 54 MG/DL
HGB BLD-MCNC: 14.9 G/DL (ref 11.5–15.5)
LDLC SERPL CALC-MCNC: 207 MG/DL
MCH RBC QN AUTO: 30.1 PG (ref 26–35)
MCHC RBC AUTO-ENTMCNC: 33 G/DL (ref 32–34.5)
MCV RBC AUTO: 91.1 FL (ref 80–99.9)
MICROORGANISM SPEC CULT: ABNORMAL
MICROORGANISM SPEC CULT: ABNORMAL
MICROORGANISM SPEC CULT: NORMAL
PLATELET # BLD AUTO: 192 K/UL (ref 130–450)
PMV BLD AUTO: 11.2 FL (ref 7–12)
POTASSIUM SERPL-SCNC: 4 MMOL/L (ref 3.5–5)
RBC # BLD AUTO: 4.95 M/UL (ref 3.5–5.5)
SODIUM SERPL-SCNC: 138 MMOL/L (ref 132–146)
SPECIMEN DESCRIPTION: ABNORMAL
SPECIMEN DESCRIPTION: NORMAL
TRIGL SERPL-MCNC: 73 MG/DL
VLDLC SERPL CALC-MCNC: 15 MG/DL
WBC OTHER # BLD: 7.8 K/UL (ref 4.5–11.5)

## 2024-05-13 PROCEDURE — 99497 ADVNCD CARE PLAN 30 MIN: CPT | Performed by: NURSE PRACTITIONER

## 2024-05-13 PROCEDURE — 93010 ELECTROCARDIOGRAM REPORT: CPT | Performed by: INTERNAL MEDICINE

## 2024-05-13 PROCEDURE — 97161 PT EVAL LOW COMPLEX 20 MIN: CPT

## 2024-05-13 PROCEDURE — 80061 LIPID PANEL: CPT

## 2024-05-13 PROCEDURE — 83036 HEMOGLOBIN GLYCOSYLATED A1C: CPT

## 2024-05-13 PROCEDURE — 36415 COLL VENOUS BLD VENIPUNCTURE: CPT

## 2024-05-13 PROCEDURE — 92610 EVALUATE SWALLOWING FUNCTION: CPT

## 2024-05-13 PROCEDURE — 97530 THERAPEUTIC ACTIVITIES: CPT

## 2024-05-13 PROCEDURE — 85027 COMPLETE CBC AUTOMATED: CPT

## 2024-05-13 PROCEDURE — 92523 SPEECH SOUND LANG COMPREHEN: CPT

## 2024-05-13 PROCEDURE — 99221 1ST HOSP IP/OBS SF/LOW 40: CPT | Performed by: NURSE PRACTITIONER

## 2024-05-13 PROCEDURE — 6360000002 HC RX W HCPCS: Performed by: NURSE PRACTITIONER

## 2024-05-13 PROCEDURE — 6370000000 HC RX 637 (ALT 250 FOR IP): Performed by: NURSE PRACTITIONER

## 2024-05-13 PROCEDURE — 2580000003 HC RX 258: Performed by: NURSE PRACTITIONER

## 2024-05-13 PROCEDURE — 80048 BASIC METABOLIC PNL TOTAL CA: CPT

## 2024-05-13 PROCEDURE — 2060000000 HC ICU INTERMEDIATE R&B

## 2024-05-13 PROCEDURE — 2580000003 HC RX 258: Performed by: INTERNAL MEDICINE

## 2024-05-13 PROCEDURE — 74018 RADEX ABDOMEN 1 VIEW: CPT

## 2024-05-13 RX ORDER — CLOPIDOGREL BISULFATE 75 MG/1
75 TABLET ORAL DAILY
Status: DISCONTINUED | OUTPATIENT
Start: 2024-05-14 | End: 2024-05-18 | Stop reason: HOSPADM

## 2024-05-13 RX ORDER — DEXTROSE MONOHYDRATE 50 MG/ML
INJECTION, SOLUTION INTRAVENOUS CONTINUOUS
Status: DISCONTINUED | OUTPATIENT
Start: 2024-05-13 | End: 2024-05-14

## 2024-05-13 RX ADMIN — DEXTROSE MONOHYDRATE: 50 INJECTION, SOLUTION INTRAVENOUS at 16:00

## 2024-05-13 RX ADMIN — ATORVASTATIN CALCIUM 40 MG: 40 TABLET, FILM COATED ORAL at 20:59

## 2024-05-13 RX ADMIN — ASPIRIN 300 MG: 300 SUPPOSITORY RECTAL at 09:51

## 2024-05-13 RX ADMIN — SODIUM CHLORIDE, PRESERVATIVE FREE 10 ML: 5 INJECTION INTRAVENOUS at 09:53

## 2024-05-13 RX ADMIN — ENOXAPARIN SODIUM 40 MG: 100 INJECTION SUBCUTANEOUS at 09:35

## 2024-05-13 NOTE — PROGRESS NOTES
SLP ALL NOTES  SPEECH/LANGUAGE PATHOLOGY  SPEECH/LANGUAGE/COGNITIVE EVALUATION   and PLAN OF CARE      PATIENT NAME:  Monique Pacheco  (female)     MRN:  [id number]    :  1932  (89 y.o.)  STATUS:  Inpatient: Room 8509/8509-A    TODAY'S DATE:  24 1145    Speech Language Pathology (SLP) eval and treat  Start:  24 114,   End:  24 1145,   ONE TIME,   Standing Count:  1 Occurrences,   R       Marilou Saleem, APRN - CNP  REASON FOR REFERRAL:  cva  EVALUATING THERAPIST: Soila Gonzales, SLP    ADMITTING DIAGNOSIS: Acute ischemic stroke (HCC) [I63.9]  Acute CVA (cerebrovascular accident) (HCC) [I63.9]    VISIT DIAGNOSIS:   Visit Diagnoses         Codes    Acute ischemic stroke (HCC)    -  Primary I63.9               SPEECH THERAPY  PLAN OF CARE   The speech therapy  POC is established based on physician order, speech pathology diagnosis and results of clinical assessment     SPEECH PATHOLOGY DIAGNOSIS:    Severe dysarthria; Mod-severe expressive language deficits; Moderate receptive language deficits; Ongoing cognitive assessment    Speech Pathology intervention is recommended up to 6 times per week for LOS or when goals are met with emphasis on the following:      Conditions Requiring Skilled Therapeutic Intervention for speech, language and/or cognition    Expressive Aphasia   Anomia  Dysarthria     Specific Speech Therapy Interventions to Include:   Receptive language training   Expressive language training     Specific instructions for next treatment:     To initiate POC    SHORT/LONG TERM GOALS  Pt will improve receptive and expressive language skills with adequate thought content, organization, and processing time to facilitate improved communication with minimal.  Pt will improve receptive language skills for response to WH- questions and follow through of simple to multi-step directions with 75% accuracy.  Pt will improve word finding and verbal fluency with phrase/sentence level,

## 2024-05-13 NOTE — PROGRESS NOTES
Requested telesitter    Patient is number 2 on waiting list per coordinator Electronically signed by Debra Urbina RN on 5/13/2024 at 10:56 AM

## 2024-05-13 NOTE — CONSULTS
[facility]  Neuro Inpatient Consult       Monique Pacheco is a 89 y.o. right handed female     Neuro is following for acute stroke    Significant PMH: dementia     HPI:    Monique Pacheco is a 81yo female who was transferred to [facility] for acute stroke. The patient presented to ED for inability to ambulate and confusion with an NIH of 0, she was admitted to [facility] on 5/11/24.  RRT was called yesterday as the patient developed acute dysphagia and complete right hemiparesis.  She was not a candidate for TNK or endovascular treatment.     CT head without contrast showed no acute intracranial abnormality.  CTAs of head and neck did not demonstrate significant stenosis or arterial occlusion.  No mismatch noted on brain perfusion.  MRI brain without contrast demonstrated an acute lacunar infarct involving the left corpus stratum, several foci of susceptibility throughout the right greater than the left parietal lobes and few scattered foci throughout the posterior fossa either reflecting sequelae of prior microhemorrhage or may reflect underlying cerebral amyloidosis.    Only medication prior to admission Namenda for dementia, no other past medical history per family.  No prior history of stroke    History provided by family, complimentary history by EMR.     Subjective:  Patient is resting in bed, family is at bedside. No new events. All questions answered and family verbalized understanding    ROS unable to obtain    Objective:     BP (!) 155/74   Pulse 78   Temp 98 °F (36.7 °C) (Temporal)   Resp 17   Ht 1.575 m (5' 2\")   Wt 70.3 kg (155 lb)   SpO2 100%   BMI 28.35 kg/m²     General appearance: lethargic, appears stated age, cooperative and no distress. NG tube in place  Head: normocephalic, without obvious abnormality, atraumatic  Eyes: conjunctivae/corneas clear. .  Neck: full ROM without cervicalgia  Lungs: non labored  Extremities: normal,

## 2024-05-13 NOTE — CONSULTS
Palliative Care Department  [phone]  Palliative Care Initial Consult  Provider SUSANA Snider - DANICA     Monique Pacheco  [id number]  Hospital Day: 2  Date of Initial Consult: 5/13/2024  Referring Provider: Milan Freedman DO   Palliative Medicine was consulted for assistance with: Goals of Care    HPI:   Monique Pacheco is a 89 y.o. with a medical history of dementia who was admitted on 5/12/2024 from home with a CHIEF COMPLAINT of confusion.  Patient was initially seen at [facility] for strokelike symptoms and transferred to [facility].  MRI showed acute lacunar infarct involving the left corpus striatum. Patient not a candidate for TNK.  Palliative medicine consulted for goals of care.  ASSESSMENT/PLAN:     Pertinent Hospital Diagnoses     Acute CVA    Palliative Care Encounter / Counseling Regarding Goals of Care  Please see detailed goals of care discussion as below  At this time, Monique Pacheco, Does Not have capacity for medical decision-making.  Capacity is time limited and situation/question specific  During encounter Lloyd  was surrogate medical decision-maker  Outcome of goals of care meeting:   Change CODE STATUS to DNR CCA  Family awaiting neurology input  Family would not be agreeable to PEG placement if needed  Code status DNR-CCA  Advanced Directives: no POA or living will in [facility]  Surrogate/Legal NOK:  Jelly Garnett, child, [phone], [phone]  Melissa Santos, child, [phone]  Lloyd Eng, son, [phone], [phone]    Spiritual assessment: no spiritual distress identified  Bereavement and grief: to be determined  Referrals to: none today  SUBJECTIVE:     Current medical issues leading to Palliative Medicine involvement include   Active Hospital Problems    Diagnosis Date Noted    Acute CVA (cerebrovascular accident) (HCC) [I63.9] 05/12/2024       Details of Conversation:    Chart reviewed.  Update received from nursing.  Patient seen resting in bed alert to voice and  follows some simple commands on the left side.  Unable to hold meaningful conversation.  Son, Lloyd, present at bedside.  Monique is not  and has 3 adult children.  Lloyd states there is a living will and names himself healthcare power of .  Requested documents.  Son states he will bring them in tomorrow when he visits.  In-depth discussion regarding current condition to include acute CVA.  Awaiting treatment plan from neurology.  Discussed goals of care and CODE STATUS options.  After further discussion son would like to change CODE STATUS to DNR CCA and proceed with all other medical management.  Family does state that they are not agreeable to PEG placement as that would go against Monique's wishes.  Discussed need for placement at discharge.  Family verbalizes understanding and agrees with plan.  At this time plan is to await neurology input.  Palliative medicine to follow-up tomorrow for further goals of care and CODE STATUS discussions.  Emotional support given and all questions addressed.  Will continue to follow.    OBJECTIVE:   Prognosis: Guarded    Physical Exam:  BP (!) 155/74   Pulse 78   Temp 98 °F (36.7 °C) (Temporal)   Resp 17   Ht 1.575 m (5' 2\")   Wt 70.3 kg (155 lb)   SpO2 100%   BMI 28.35 kg/m²   Constitutional:  awake, following some simple commands on the left side  Lungs: CTA bilaterally, no audible rhonchi or wheezes noted, respirations unlabored, no retractions  Heart: RRR, distant heart tones, no murmur, rub, or gallop noted during exam  Abd:  Soft, non tender, non distended, bowel sounds present  Neuro: Alert to voice, following simple commands on the left side    Objective data reviewed: labs, images, records, medication use, vitals, and chart    Discussed patient and the plan of care with the other IDT members: Palliative Medicine IDT Team    Time/Communication  Greater than 50% of time spent, total 40 minutes in counseling and coordination of care at the

## 2024-05-13 NOTE — PROGRESS NOTES
Initial Assessment     Name: Monique Pacheco  : 1932  MRN: [id number]    Date of Service: 2024    Evaluating PT:  Lloyd Garzon, PT [id number]    Room #:  8509/8509-A  Diagnosis:  Acute Ischemic Stroke  Surgery: None  History reviewed. No pertinent past medical history.   History reviewed. No pertinent surgical history.  Precautions: Falls, alarm, dementia, NG tube, and expressive aphasia  , FWB (full weight bearing)  Equipment Needs:  To be determined    Unable to get PLOF and prior living arrangements due to patient's expressive aphasia and none present in chart upon review.     Initial Evaluation  Date:  Treatment Short Term/ Long Term   Goals   AM-PAC 6 Clicks 10/24     Was pt agreeable to Eval/treatment? Yes     Does pt have pain? No     Bed Mobility  Rolling: Max  Supine to sit: Max  Sit to supine: Max  Scooting: Max  Rolling: Min  Supine to sit: Min  Sit to supine: Min  Scooting: Min   Transfers Sit to stand: Max  Stand to sit: Max  Stand pivot: NT  Sit to stand: Min with AD  Stand to sit: Min with AD  Stand pivot: Min with AD   Ambulation    NT  25 feet with FWW Mod   Stair negotiation: ascended and descended  NT  TBD   ROM BUE:  WFL  BLE:  decreased DF BL     Strength BUE: Grossly LUE 3+/5            Grossly RUE 2/5  RLE: Grossly 2/5  LLE:   Grossly 4-/5  4/5   Balance Sitting EOB:  Min  Dynamic Standing:  Max  Sitting EOB:  Ind  Dynamic Standing:  Min     Pt is A & O x 1 Patient is alerted to self. ? Location and time. Patient able to answer yes or no questions.  Sensation:  NT  Edema:  None    Patient education  Pt educated and provided call light for safety    Patient response to education:   Pt verbalized understanding Pt demonstrated skill Pt requires further education in this area   ? No Yes     Comments:  This patient was seen and agreeable to PT evaluation. Patient unable to answer social history questions due to expressive aphasia, but was able to answer yes or no questions. Max assist

## 2024-05-13 NOTE — PROGRESS NOTES
SPEECH/LANGUAGE PATHOLOGY  CLINICAL ASSESSMENT OF SWALLOWING FUNCTION   and PLAN OF CARE    PATIENT NAME:  Monique Pacheco  (female)     MRN:  [id number]    :  1932  (89 y.o.)  STATUS:  Inpatient: Room 8509/8509-A    TODAY'S DATE:  24 1145    Speech Language Pathology (SLP) eval and treat  Start:  24 1145,   End:  24 1145,   ONE TIME,   Standing Count:  1 Occurrences,   R       Marilou Saleem, APRN - CNP  REASON FOR REFERRAL: cva   EVALUATING THERAPIST: Soila Gonzales, SLP                 RESULTS:    DYSPHAGIA DIAGNOSIS:   Clinical indicators of suspected moderate-severe oropharyngeal phase dysphagia       DIET RECOMMENDATIONS:  NPO until MBSS can be completed        FEEDING RECOMMENDATIONS:     Assistance level:  Not applicable      Compensatory strategies recommended: Thorough oral care to prevent colonization of oral bacteria       Discussed recommendations with:  patient nurse in person    SPEECH THERAPY  PLAN OF CARE   The dysphagia POC is established based on physician order, dysphagia diagnosis and results of clinical assessment     Will establish POC once MBSS is completed.    Conditions Requiring Skilled Therapeutic Intervention for dysphagia:    Patient is performing below functional baseline d/t  current acute condition, respiratory compromise, multiple medications, and/or increased dependency upon caregivers.    Specific dysphagia interventions to include:     MBSS to fully assess oropharyngeal swallow function and to assist in determining the least restrictive PO diet to maintain adequate nutrition/hydration     Specific instructions for next treatment:  MBSS to be completed  Patient Treatment Goals:    Short Term Goals:  Pt will participate in MBSS to fully assess oropharyngeal swallow function and to assist in determining the least restrictive PO diet to maintain adequate nutrition/hydration     Long Term Goals:   Pt will improve oropharyngeal swallow function to

## 2024-05-13 NOTE — ACP (ADVANCE CARE PLANNING)
Advance Care Planning      Palliative Medicine Provider (MD/NP)  Advance Care Planning (ACP) Conversation      Date of Conversation: 05/13/24  The patient and/or authorized decision maker consented to a voluntary Advance Care Planning conversation.   Individuals present for the conversation:   Legal healthcare agent named below    Legal Healthcare Agent(s):  Lloyd Pacheco (son) [phone]    ACP documents available in EMR prior to discussion:  -Power of  for Healthcare  -Living Will    Primary Palliative Diagnosis(es):  CVA    Conversation Summary:  Monique is not  and has 3 adult children.  She lives at home with her daughter, Jelly, there is a living will and son, Lloyd, names himself healthcare power of .  States he will bring paperwork in tomorrow when he comes to visit.  At this time family is awaiting neurology input.  Discussed goals of care and CODE STATUS options.  Family decided to change CODE STATUS to DNR CCA and proceed with all other medical management.  Family would not be agreeable to PEG placement as that would go against Monique's wishes.  Discussed need for placement after discharge.    Resuscitation Status:    Code Status: DNR-CCA    Outcomes / Completed Documentation:  An explanation of advance directives and their importance was provided and the following forms completed:    -Patient/surrogate was asked to submit existing ACP documents for our records    If new document completed, original was provided to patient and/or family member.    Copy was placed for scanning into the [facility] EMR.      I spent 30 minutes providing separately identifiable ACP services with the patient and/or surrogate decision maker in a voluntary, in-person conversation discussing the patient's wishes and goals as detailed in the above note.       Shy Villegas, APRN - CNP

## 2024-05-13 NOTE — H&P
Hospital Medicine History & Physical      PCP: Corazon King DO    Date of Admission: 5/12/2024    Date of Service: .MAY 13, 2024    Chief Complaint:   STROKE LIKE SYMPTOMS       History Of Present Illness:     89 y.o. female presented with STROKE LIKE SYMPTOMS.   SHE HAS A BASELINE DEMENTIA.   WAS INITIALLY SEEN IN [place] FOR STROKE LIKE SYMPTOMS.  TRANSFERRED TO [facility], SEEN BY IR NEUROLOGY, NOT A CANDIDATE TO [facility].   MRI SHOWS A LACUNAR INFARCT . SHE IS PLEASANTLY CONFUSED     Past Medical History:   DEMENTIA      Past Surgical History:      History reviewed. No pertinent surgical history.    Medications Prior to Admission:      Prior to Admission medications    Medication Sig Start Date End Date Taking? Authorizing Provider   memantine (NAMENDA) 5 MG tablet Take 1 tablet by mouth 2 times daily    Provider, MD Leonora       Allergies:  Patient has no known allergies.    Social History:          TOBACCO:   reports that she has never smoked. She has never used smokeless tobacco.  ETOH:   reports that she does not currently use alcohol.      Family History:       Reviewed in detail and negative for DM, CAD, Cancer, CVA. Positive as follows:    History reviewed. No pertinent family history.    REVIEW OF SYSTEMS:   Pertinent positives as noted in the HPI. All other systems reviewed and negative.    PHYSICAL EXAM:    BP (!) 155/74   Pulse 78   Temp 98 °F (36.7 °C) (Temporal)   Resp 17   Ht 1.575 m (5' 2\")   Wt 70.3 kg (155 lb)   SpO2 100%   BMI 28.35 kg/m²     General appearance:  No apparent distress, appears stated age.  HEENT:  Normal cephalic, atraumatic without obvious deformity. Pupils equal, round, and reactive to light.  Extra ocular muscles intact. Conjunctivae/corneas clear. FACIAL DROOP  Neck: Supple, with full range of motion. No jugular venous distention. Trachea

## 2024-05-13 NOTE — CARE COORDINATION
Transfer from [place] for strokelike symptoms.  Neurology consult. Pt/ot speech. Met with son Meliton in room and his wife Mary. He is the POA. Patient lives with her daughter Jelly ( who has stage 4 colon cancer)  in 2 story home with 1 st floor set up however shower is in the basement.  Her PCP is Dr King.and she uses [facility] pharmacy in [place]. They have walker.  No hhc or ROSALINDA history. Message to Dr Freedman that  famly in room and would like to speak to her and  asking about palliative consult. Plan to be determined. Cm/sw to follow.Electronically signed by Shanna Bravo RN on 5/13/2024 at 2:07 PM

## 2024-05-14 ENCOUNTER — APPOINTMENT (OUTPATIENT)
Dept: GENERAL RADIOLOGY | Age: 89
End: 2024-05-14
Payer: MEDICARE

## 2024-05-14 LAB
ANION GAP SERPL CALCULATED.3IONS-SCNC: 13 MMOL/L (ref 7–16)
BNP SERPL-MCNC: 141 PG/ML (ref 0–450)
BUN SERPL-MCNC: 12 MG/DL (ref 6–23)
CALCIUM SERPL-MCNC: 8.9 MG/DL (ref 8.6–10.2)
CHLORIDE SERPL-SCNC: 100 MMOL/L (ref 98–107)
CO2 SERPL-SCNC: 23 MMOL/L (ref 22–29)
CREAT SERPL-MCNC: 0.7 MG/DL (ref 0.5–1)
ERYTHROCYTE [DISTWIDTH] IN BLOOD BY AUTOMATED COUNT: 12.8 % (ref 11.5–15)
GFR, ESTIMATED: 78 ML/MIN/1.73M2
GLUCOSE SERPL-MCNC: 115 MG/DL (ref 74–99)
HCT VFR BLD AUTO: 42.7 % (ref 34–48)
HGB BLD-MCNC: 13.9 G/DL (ref 11.5–15.5)
MCH RBC QN AUTO: 29.8 PG (ref 26–35)
MCHC RBC AUTO-ENTMCNC: 32.6 G/DL (ref 32–34.5)
MCV RBC AUTO: 91.4 FL (ref 80–99.9)
PLATELET # BLD AUTO: 191 K/UL (ref 130–450)
PMV BLD AUTO: 11.1 FL (ref 7–12)
POTASSIUM SERPL-SCNC: 3.7 MMOL/L (ref 3.5–5)
RBC # BLD AUTO: 4.67 M/UL (ref 3.5–5.5)
SODIUM SERPL-SCNC: 136 MMOL/L (ref 132–146)
WBC OTHER # BLD: 6.6 K/UL (ref 4.5–11.5)

## 2024-05-14 PROCEDURE — 97166 OT EVAL MOD COMPLEX 45 MIN: CPT

## 2024-05-14 PROCEDURE — 92526 ORAL FUNCTION THERAPY: CPT

## 2024-05-14 PROCEDURE — 2580000003 HC RX 258: Performed by: NURSE PRACTITIONER

## 2024-05-14 PROCEDURE — 2500000003 HC RX 250 WO HCPCS: Performed by: PHYSICIAN ASSISTANT

## 2024-05-14 PROCEDURE — 71045 X-RAY EXAM CHEST 1 VIEW: CPT

## 2024-05-14 PROCEDURE — 6360000002 HC RX W HCPCS: Performed by: NURSE PRACTITIONER

## 2024-05-14 PROCEDURE — 85027 COMPLETE CBC AUTOMATED: CPT

## 2024-05-14 PROCEDURE — 6370000000 HC RX 637 (ALT 250 FOR IP): Performed by: NURSE PRACTITIONER

## 2024-05-14 PROCEDURE — 2580000003 HC RX 258: Performed by: INTERNAL MEDICINE

## 2024-05-14 PROCEDURE — 94640 AIRWAY INHALATION TREATMENT: CPT

## 2024-05-14 PROCEDURE — 80048 BASIC METABOLIC PNL TOTAL CA: CPT

## 2024-05-14 PROCEDURE — 2060000000 HC ICU INTERMEDIATE R&B

## 2024-05-14 PROCEDURE — 74230 X-RAY XM SWLNG FUNCJ C+: CPT

## 2024-05-14 PROCEDURE — 92611 MOTION FLUOROSCOPY/SWALLOW: CPT

## 2024-05-14 PROCEDURE — 97530 THERAPEUTIC ACTIVITIES: CPT

## 2024-05-14 PROCEDURE — 36415 COLL VENOUS BLD VENIPUNCTURE: CPT

## 2024-05-14 PROCEDURE — 83880 ASSAY OF NATRIURETIC PEPTIDE: CPT

## 2024-05-14 PROCEDURE — 97535 SELF CARE MNGMENT TRAINING: CPT

## 2024-05-14 RX ORDER — HYDRALAZINE HYDROCHLORIDE 20 MG/ML
5 INJECTION INTRAMUSCULAR; INTRAVENOUS ONCE
Status: COMPLETED | OUTPATIENT
Start: 2024-05-14 | End: 2024-05-14

## 2024-05-14 RX ORDER — ALBUTEROL SULFATE 2.5 MG/3ML
2.5 SOLUTION RESPIRATORY (INHALATION)
Status: DISCONTINUED | OUTPATIENT
Start: 2024-05-14 | End: 2024-05-18 | Stop reason: HOSPADM

## 2024-05-14 RX ADMIN — BARIUM SULFATE 15 ML: 400 PASTE ORAL at 14:41

## 2024-05-14 RX ADMIN — BARIUM SULFATE 15 ML: 0.81 POWDER, FOR SUSPENSION ORAL at 14:41

## 2024-05-14 RX ADMIN — ASPIRIN 300 MG: 300 SUPPOSITORY RECTAL at 07:32

## 2024-05-14 RX ADMIN — ENOXAPARIN SODIUM 40 MG: 100 INJECTION SUBCUTANEOUS at 09:07

## 2024-05-14 RX ADMIN — SODIUM CHLORIDE, PRESERVATIVE FREE 10 ML: 5 INJECTION INTRAVENOUS at 20:23

## 2024-05-14 RX ADMIN — ATORVASTATIN CALCIUM 40 MG: 40 TABLET, FILM COATED ORAL at 20:23

## 2024-05-14 RX ADMIN — ALBUTEROL SULFATE 2.5 MG: 2.5 SOLUTION RESPIRATORY (INHALATION) at 21:01

## 2024-05-14 RX ADMIN — DEXTROSE MONOHYDRATE: 50 INJECTION, SOLUTION INTRAVENOUS at 09:09

## 2024-05-14 RX ADMIN — BARIUM SULFATE 15 ML: 400 SUSPENSION ORAL at 14:41

## 2024-05-14 RX ADMIN — HYDRALAZINE HYDROCHLORIDE 5 MG: 20 INJECTION INTRAMUSCULAR; INTRAVENOUS at 12:03

## 2024-05-14 NOTE — PLAN OF CARE
Problem: Safety - Medical Restraint  Goal: Remains free of injury from restraints (Restraint for Interference with Medical Device)  Description: INTERVENTIONS:  1. Determine that other, less restrictive measures have been tried or would not be effective before applying the restraint  2. Evaluate the patient's condition at the time of restraint application  3. Inform patient/family regarding the reason for restraint  4. Q2H: Monitor safety, psychosocial status, comfort, nutrition and hydration  Outcome: Progressing     Problem: Safety - Adult  Goal: Free from fall injury  Outcome: Progressing     Problem: Chronic Conditions and Co-morbidities  Goal: Patient's chronic conditions and co-morbidity symptoms are monitored and maintained or improved  Outcome: Progressing     Problem: Discharge Planning  Goal: Discharge to home or other facility with appropriate resources  Outcome: Progressing     Problem: Pain  Goal: Verbalizes/displays adequate comfort level or baseline comfort level  Outcome: Progressing     Problem: Skin/Tissue Integrity  Goal: Absence of new skin breakdown  Description: 1.  Monitor for areas of redness and/or skin breakdown  2.  Assess vascular access sites hourly  3.  Every 4-6 hours minimum:  Change oxygen saturation probe site  4.  Every 4-6 hours:  If on nasal continuous positive airway pressure, respiratory therapy assess nares and determine need for appliance change or resting period.  Outcome: Progressing     Problem: ABCDS Injury Assessment  Goal: Absence of physical injury  Outcome: Progressing

## 2024-05-14 NOTE — CARE COORDINATION
Transfer from [place] for strokelike symptoms. Neurology consult. Pt 10 awaiting  OT. . Pulmonology consult-  Right lower lobe partial collapsePalliative on consult. Awaiting mbss had ngt which pt had pulled out. Discharge plan to be determined.Electronically signed by Shanna Bravo RN on 5/14/2024 at 2:20 PM    Son and daughter in law in room snf choices 1. [facility],2 [place] 3. [place] Electronically signed by Shanna Bravo RN on 5/15/2024 at 1:57 PM

## 2024-05-14 NOTE — PROGRESS NOTES
PT Treatment Note    Name: Monique Pacheco  : 1932  MRN: [id number]    Date of Service: 2024    Evaluating PT:  Lloyd Gazron, PT [id number]    Room #:  8509/8509-A  Diagnosis:  Acute Ischemic Stroke  Surgery: None  History reviewed. No pertinent past medical history.   History reviewed. No pertinent surgical history.  Precautions: Falls, alarm, dementia, NG tube, and expressive aphasia  , FWB (full weight bearing)  Equipment Needs:  To be determined    Unable to get PLOF and prior living arrangements due to patient's expressive aphasia and none present in chart upon review.     Initial Evaluation  Date:  Treatment  Date:  Short Term/ Long Term   Goals   AM-PAC 6 Clicks 10/24 10/24    Was pt agreeable to Eval/treatment? Yes Yes    Does pt have pain? No No    Bed Mobility  Rolling: Max  Supine to sit: Max  Sit to supine: Max  Scooting: Max Rolling: Max  Supine to sit: Max  Sit to Supine: Max  Scooting: Max Rolling: Min  Supine to sit: Min  Sit to supine: Min  Scooting: Min   Transfers Sit to stand: Max  Stand to sit: Max  Stand pivot: NT Sit to Stand: Mod  Stand to Sit: Mod  Stand pivot: NT Sit to stand: Min with AD  Stand to sit: Min with AD  Stand pivot: Min with AD   Ambulation    NT 4 steps forward and backwards in place with LLE MaxA RLE blocked for stability  25 feet with FWW Mod   Stair negotiation: ascended and descended  NT NT TBD   ROM BUE:  WFL  BLE:  decreased DF BLmost likely premorbid BUE: See OT Note  BLE: same as IE    Strength BUE: Grossly LUE 3+/5            Grossly RUE 2/5  RLE: Grossly 2/5  LLE:   Grossly 4-/5 BUE: See OT Note  BLE: RLE: grossly 3/5       LLE: grossly  4/5   Balance Sitting EOB:  Min  Dynamic Standing:  Max Sitting EOB: Min  Dynamic Standing: Max Sitting EOB:  Ind  Dynamic Standing:  Min     Pt is A & O x 1 Patient is alerted to self. ? Location and time. Patient able to answer yes or no questions.  Sensation:  NT  Edema:  None  Vitals: SpO2 94% on 3L  HR 94 prior

## 2024-05-14 NOTE — PROGRESS NOTES
[facility]                                Progress note    Subjective:    The patient is awake and alert.    Resting in bed  No complaints  Confused    Objective:    BP (!) 159/61   Pulse 74   Temp 98 °F (36.7 °C) (Oral)   Resp 18   Ht 1.575 m (5' 2\")   Wt 70.3 kg (155 lb)   SpO2 98%   BMI 28.35 kg/m²     In: -   Out: 400   In: -   Out: 400 [Urine:400]    General appearance: NAD, conversant, confused, expressive aphasia  HEENT: AT/NC, MMM  Neck: FROM, supple  Lungs: Clear to auscultation  CV: RRR, no MRGs  Vasc: Radial pulses 2+  Abdomen: Soft, non-tender; no masses or HSM  Extremities: No peripheral edema or digital cyanosis  Skin: no rash, lesions or ulcers  Psych: Alert and oriented to person  Neuro: Alert and interactive     Recent Labs     05/12/24  0844 05/13/24  0611 05/14/24  0444   WBC 8.0 7.8 6.6   HGB 12.6 14.9 13.9   HCT 38.5 45.1 42.7    192 191       Recent Labs     05/12/24  0844 05/13/24  0611 05/14/24  0444    138 136   K 3.6 4.0 3.7    101 100   CO2 20* 22 23   BUN 8 9 12   CREATININE 0.6 0.7 0.7   CALCIUM 7.1* 9.4 8.9       Assessment:    Principal Problem:    Acute CVA (cerebrovascular accident) (HCC)  Active Problems:    Palliative care encounter    Goals of care, counseling/discussion  Resolved Problems:    * No resolved hospital problems. *      Plan:    Patient is a 89-year-old female admitted to [facility] for  Acute CVA  -MBS to, passed with puréed foods and thin liquids  -DC IVF given elevated Bps  -May need additional antihypertensive maintenance therapy, however continue to monitor BPs for now  -PT/OT 10/24, await further discussion with case management and family to determine discharge planning  -Continue DAPT x 21 days with ASA monotherapy thereafter  -Continue statin  -Pulmonology following  -Echo declined by family    Code status: DNR-CCA  Consultants: Pulmonology and neurology  DVT Prophylaxis   PT/OT  Discharge planning       I have

## 2024-05-14 NOTE — PLAN OF CARE
Problem: Safety - Medical Restraint  Goal: Remains free of injury from restraints (Restraint for Interference with Medical Device)  Description: INTERVENTIONS:  1. Determine that other, less restrictive measures have been tried or would not be effective before applying the restraint  2. Evaluate the patient's condition at the time of restraint application  3. Inform patient/family regarding the reason for restraint  4. Q2H: Monitor safety, psychosocial status, comfort, nutrition and hydration  5/14/2024 1026 by Preeti Curran RN  Outcome: Progressing  5/14/2024 0359 by Matilda Blake RN  Outcome: Progressing     Problem: Safety - Adult  Goal: Free from fall injury  5/14/2024 1026 by Preeti Curran RN  Outcome: Progressing  5/14/2024 0359 by Matilda Blake, RN  Outcome: Progressing

## 2024-05-14 NOTE — PROGRESS NOTES
include:   * Instruction/training on adapted ADL techniques and AE recommendations to increase functional independence within precautions       * Training on energy conservation strategies, correct breathing pattern and techniques to improve independence/tolerance for self-care routine  * Functional transfer/mobility training/DME recommendations for increased independence, safety, and fall prevention  * Patient/Family education to increase follow through with safety techniques and functional independence  * Recommendation of environmental modifications for increased safety with functional transfers/mobility and ADLs  * Cognitive retraining/development of therapeutic activities to improve problem solving, judgement, memory, and attention for increased safety/participation in ADL/IADL tasks  * Sensory re-education to improve body/limb awareness, maintain/improve skin integrity, and improve hand/UE motor function  * Therapeutic exercise to improve motor endurance, ROM, and functional strength for ADLs/functional transfers  * Therapeutic activities to facilitate/challenge dynamic balance, stand tolerance for increased safety and independence with ADLs  * Therapeutic activities to facilitate gross/fine motor skills for increased independence with ADLs  * Neuro-muscular re-education: facilitation of righting/equilibrium reactions, midline orientation, scapular stability/mobility, normalization of muscle tone, and facilitation of volitional active controled movement  * Positioning to improve skin integrity, interaction with environment and functional independence  * Delirium prevention/treatment    Modified [place] Scale   Score     Description  0             No symptoms  1             No significant disability despite symptoms  2             Slight disability; able to look after own affairs  3             Moderate disability; able to ambulate without assist/ requires assist with ADLs  4             Moderate/Severe  supine: Max A   Supine to sit: Min A   Sit to supine: Min A    Functional Transfers Sit to stand: Mod A    Stand to sit: Mod A    Stand pivot: NT   Min A    Functional Mobility Max A With no AD  for side steps to the left  to sit higher up in bed   Min A   with AAD    Balance Sitting: Min A       Standing: Max A   Sitting: Supervision       Standing: Min A    Activity Tolerance Fair  Pt on 3L o2 via nc throughout session; SpO2 94-99%  and fatigue   Good   Visual/  Perceptual Needs continued assessment  transport arrived to take Pt to swallow study - Pt appeared to have difficulty locating self care items while seated EOB               BUE  ROM/Strength/  Fine motor Coordination Hand dominance: right     RUE: ROM impaired     Strength: grossly 2-/5      Strength: impaired can complete light grasp and release on command but needs A to maintain  on grooming tools      Coordination:  impaired    LUE: ROM WFL      Strength: grossly 4/5      Strength: WFL      Coordination: WFL   Pt will participate in BUE exercise with supervision to increase strength, ROM, and coordination for increased independence in functional mobility and ADLs    Safety   Fair -  Fair + during functional activity      Hearing: needs increased volume   Sensation:  denies numbness   Tone: WFL   Edema: unremarkable     Comments:   RN cleared patient for OT.  Upon arrival, patient was semi-supine in bed  and agreeable to OT session. family present  at the beginning of the session, but left prior to functional mobility/ADLs . At end of session, patient semi-supine in bed ; with transport to go to swallow study   Patient presents with decreased safety awareness, activity tolerance , balance , coordination, strength , ROM , and cognition. Pt demonstrated decreased independence during ADLs, bed mobility , functional transfers, and functional mobility. Pt would benefit from continued skilled OT to increase safety and independence with completion

## 2024-05-14 NOTE — PROGRESS NOTES
Palliative Care Department  Progress Note  Provider Fanny Davenport APRN-CNS    Monique Pacheco  [id number]  Hospital Day: 2  Date of Initial Consult: 5/13/2024  Referring Provider: Milan Freedman DO   Palliative Medicine was consulted for assistance with: Goals of Care    HPI:   Monique Pacheco is a 89 y.o. with a medical history of dementia who was admitted on 5/12/2024 from home with a CHIEF COMPLAINT of confusion.  Patient was initially seen at [facility] for stroke like symptoms and transferred to [facility].  MRI showed acute lacunar infarct involving the left corpus striatum. Patient not a candidate for TNK.  Palliative medicine consulted for goals of care.    ASSESSMENT/PLAN:     Pertinent Hospital Diagnoses   Acute CVA  Dysphagia    Palliative Care Encounter / Counseling Regarding Goals of Care  Please see detailed goals of care discussion as below  At this time, Monique Pacheco, Does Not have capacity for medical decision-making.  Capacity is time limited and situation/question specific  During encounter Lloyd  was surrogate medical decision-maker  Outcome of goals of care meeting:   DNR CCA  Family awaiting neurology input  Family would not be agreeable to PEG placement if needed  Code status DNR-CCA  Advanced Directives: has POA or living will in epic in soft chart  Surrogate/Legal NOK:  (HCPOA) Lloyd Eng, son, [phone], [phone]  Jelly Garnett, child, [phone], [phone]  Melissa Santos, child, [phone]    Spiritual assessment: no spiritual distress identified  Bereavement and grief: to be determined  Referrals to: none today  SUBJECTIVE:     Current medical issues leading to Palliative Medicine involvement include   Active Hospital Problems    Diagnosis Date Noted    Acute CVA (cerebrovascular accident) (HCC) [I63.9] 05/12/2024       Details of Conversation:    5/15/24 Chart reviewed.  Update received from nursing. Continues to be agitated pulling at lines. Has ativan IV 0.5 mg.

## 2024-05-14 NOTE — PROGRESS NOTES
Palliative Care Department  [phone]  Progress Note  Fanny Davenport APRN-CNS  Monique Castabiel  [id number]  Hospital Day: 2  Date of Initial Consult: 5/13/2024  Referring Provider: Milan Freedman DO   Palliative Medicine was consulted for assistance with: Goals of Care    Pt off unit for video swallow. No family in room. Will will seen tomorrow for further discussion of goals if needed. Notes indicate family does state that they are not agreeable to PEG placement as that would go against Monique's wishes.  Will follow along.

## 2024-05-14 NOTE — PROGRESS NOTES
fed, Fed by clinician      Position:   Sitting in the MBSS chair with head elevated above 75 degrees, Lateral position    INSTRUMENTAL ASSESSMENT:    ORAL PREP/ ORAL PHASE:    Delayed A-P transit due to: decreased ability for initiation    and reduced lingual strength      PHARYNGEAL PHASE:     ONSET TIME       Delayed initiation of the pharyngeal swallow was noted with swallow reflex triggered at the level of the vallecula and pyriform sinus        PHARYNGEAL RESIDUALS        Vallecula/Pharyngeal Wall           No significant residuals were noted in the vallecula      Pyriform Sinuses      No significant residuals were noted in the pyriform sinuses     LARYNGEAL PENETRATION   Laryngeal penetration occurred in the absence of aspiration DURING the swallow for thin liquid due to  delayed laryngeal closure which cleared from the laryngeal vestibule spontaneously (transient). Laryngeal penetration was minimal and occurred inconsistently   an absent cough/throat clear was noted    ASPIRATION  Aspiration occurred DURING the swallow for nectar consistency liquid due to  delayed laryngeal closure . Aspiration was mild and occurred consistently .a spontaneous cough was noted    PENETRATION-ASPIRATION SCALE (PAS):  THIN 2 = Material enters the airway, remains above vocal folds, and is ejected from the airway   MILDLY THICK 7 = Material enters the airway, passes below the vocal folds, and is not ejected from the trachea despite effort   MODERATELY THICK item not administered  PUREE 1 = Material does not enter the airway  HARD SOLID item not administered       COMPENSATORY STRATEGIES    Compensatory strategies that were beneficial included  Slow rate of intake , SINGLE cup sips, and SMALL bites      STRUCTURAL/FUNCTIONAL ANOMALIES   Atypical shadow noted at base of tongue region. MBSS view is not a diagnostic view.     CERVICAL ESOPHAGEAL STAGE :     The cervical esophagus appeared adequate          ___________    Cognition:      Soila Gonzales M.S., CCC-SLP  Speech-Language Pathologist  SP. [id number]

## 2024-05-14 NOTE — PROGRESS NOTES
Patient pulled out NG tube, notified Ester Leong NP. Per Ester okay to leave NG tube out until after MBSS.

## 2024-05-14 NOTE — CONSULTS
John Polo M.D.,[facility]  Ham Mendez D.O., [employer]ANT[employer]., [facility]  Jamal Castro M.D.  Judy Glass M.D.   Cresencio Martinez D.O.      Patient:  Monique Pacheco 89 y.o. female MRN: [id number]           PULMONARY CONSULTATION    Reason for Consultation: Right lower lobe partial lung collapse  Referring Physician: Dr. Milan Freedman,     Communication with the referring physician will be sent via the electronic medical record.    Chief Complaint: Altered mental status, aphasia, stroke    CODE STATUS: DNR CCA    SUBJECTIVE:  HPI:  Monique Pacheco is a 89 y.o. female who we are asked to evaluate for right lower lobe partial lung collapse.  She is not previously known to our pulmonary service.  She has a past medical history significant for underlying dementia.    She initially presented to the ED at [facility] on 5/11/2024.  She presented with altered mental status.  She takes Aricept for underlying dementia.  She is not a good historian and information has been obtained from review of her medical record.  Family reported her being more confused than baseline.  No fever chills nausea or vomiting.  No chest pain or shortness of breath.  She was having difficulty with ambulation, unsteady gait.  She was transferred to stepdown unit.  Stroke symptoms were observed RRT called.  Acute symptoms of dysphagia and complete right-sided hemiparesis.  Transferred to ICU.  Telestroke with no sign of definite perfusion defect on CT.  Not a candidate for TNK.  Received Keppra, Plavix, ASA.  Transferred to [facility] for further neurological evaluation.  MRI brain completed with acute lacunar infarct involving left corpus stratum, several foci of susceptibility throughout the right greater than left parietal lobes and few scattered foci throughout the posterior fossa, either reflecting sequelae of prior microhemorrhage or.  possible underlying cerebral amyloidosis.    Radiology review-chest x-ray right  murmur or gallop  Respiratory: Clear to auscultation bilaterally without wheezing or crackles.  Air entry is symmetric  Abdomen: soft, non-tender, non-distended, normal bowel sounds  Extremities: warm, no edema, no clubbing  Skin: no rash or lesion  Neurologic-right hemiparesis upper and lower extremities    Pulmonary Function Testing none    Imaging personally reviewed:  Chest x-ray  IMPRESSION:  1. Right lower lung field atelectasis again noted, when compared to the  previous study performed 05/11/2024.  2. Rounded density in the right paratracheal region is again noted on the  recent chest radiograph, however, it is not readily identifiable on a study  performed 05/27/2023.  This could represent prominent vascularity. The  presence of a mediastinal or lung mass cannot be entirely excluded. CT scan  of the chest with IV contrast can be performed for further evaluation.      CT head without contrast showed no acute intracranial abnormality.  CTAs of head and neck did not demonstrate significant stenosis or arterial occlusion.  No mismatch noted on brain perfusion.  MRI brain without contrast demonstrated an acute lacunar infarct involving the left corpus stratum, several foci of susceptibility throughout the right greater than the left parietal lobes and few scattered foci throughout the posterior fossa either reflecting sequelae of prior microhemorrhage or may reflect underlying cerebral amyloidosis.       Echo:  Family declined    Labs:  Lab Results   Component Value Date/Time    WBC 6.6 05/14/2024 04:44 AM    RBC 4.67 05/14/2024 04:44 AM    HGB 13.9 05/14/2024 04:44 AM    HCT 42.7 05/14/2024 04:44 AM    MCV 91.4 05/14/2024 04:44 AM    MCH 29.8 05/14/2024 04:44 AM    MCHC 32.6 05/14/2024 04:44 AM    RDW 12.8 05/14/2024 04:44 AM     05/14/2024 04:44 AM    MPV 11.1 05/14/2024 04:44 AM     Lab Results   Component Value Date/Time     05/14/2024 04:44 AM    K 3.7 05/14/2024 04:44 AM     05/14/2024

## 2024-05-15 ENCOUNTER — APPOINTMENT (OUTPATIENT)
Dept: GENERAL RADIOLOGY | Age: 89
End: 2024-05-15
Payer: MEDICARE

## 2024-05-15 PROBLEM — Z79.01 ANTICOAGULATION GOAL OF INR 1.5 TO 2.5: Status: ACTIVE | Noted: 2024-05-15

## 2024-05-15 PROBLEM — I63.9 ACUTE ISCHEMIC STROKE (HCC): Status: ACTIVE | Noted: 2024-05-15

## 2024-05-15 PROBLEM — Z51.81 ANTICOAGULATION GOAL OF INR 1.5 TO 2.5: Status: ACTIVE | Noted: 2024-05-15

## 2024-05-15 LAB
ANION GAP SERPL CALCULATED.3IONS-SCNC: 14 MMOL/L (ref 7–16)
BUN SERPL-MCNC: 13 MG/DL (ref 6–23)
CALCIUM SERPL-MCNC: 9.2 MG/DL (ref 8.6–10.2)
CHLORIDE SERPL-SCNC: 99 MMOL/L (ref 98–107)
CO2 SERPL-SCNC: 24 MMOL/L (ref 22–29)
CREAT SERPL-MCNC: 0.8 MG/DL (ref 0.5–1)
ERYTHROCYTE [DISTWIDTH] IN BLOOD BY AUTOMATED COUNT: 12.8 % (ref 11.5–15)
GFR, ESTIMATED: 72 ML/MIN/1.73M2
GLUCOSE BLD-MCNC: 102 MG/DL (ref 74–99)
GLUCOSE SERPL-MCNC: 112 MG/DL (ref 74–99)
HCT VFR BLD AUTO: 41.5 % (ref 34–48)
HGB BLD-MCNC: 13.4 G/DL (ref 11.5–15.5)
MCH RBC QN AUTO: 29.5 PG (ref 26–35)
MCHC RBC AUTO-ENTMCNC: 32.3 G/DL (ref 32–34.5)
MCV RBC AUTO: 91.2 FL (ref 80–99.9)
PLATELET # BLD AUTO: 205 K/UL (ref 130–450)
PMV BLD AUTO: 11.2 FL (ref 7–12)
POTASSIUM SERPL-SCNC: 3.8 MMOL/L (ref 3.5–5)
RBC # BLD AUTO: 4.55 M/UL (ref 3.5–5.5)
SODIUM SERPL-SCNC: 137 MMOL/L (ref 132–146)
WBC OTHER # BLD: 6.6 K/UL (ref 4.5–11.5)

## 2024-05-15 PROCEDURE — 6370000000 HC RX 637 (ALT 250 FOR IP): Performed by: NURSE PRACTITIONER

## 2024-05-15 PROCEDURE — 82962 GLUCOSE BLOOD TEST: CPT

## 2024-05-15 PROCEDURE — 2580000003 HC RX 258: Performed by: NURSE PRACTITIONER

## 2024-05-15 PROCEDURE — 80048 BASIC METABOLIC PNL TOTAL CA: CPT

## 2024-05-15 PROCEDURE — 6360000002 HC RX W HCPCS: Performed by: NURSE PRACTITIONER

## 2024-05-15 PROCEDURE — 6370000000 HC RX 637 (ALT 250 FOR IP): Performed by: PHYSICIAN ASSISTANT

## 2024-05-15 PROCEDURE — 71045 X-RAY EXAM CHEST 1 VIEW: CPT

## 2024-05-15 PROCEDURE — 2700000000 HC OXYGEN THERAPY PER DAY

## 2024-05-15 PROCEDURE — 94640 AIRWAY INHALATION TREATMENT: CPT

## 2024-05-15 PROCEDURE — 36415 COLL VENOUS BLD VENIPUNCTURE: CPT

## 2024-05-15 PROCEDURE — 2060000000 HC ICU INTERMEDIATE R&B

## 2024-05-15 PROCEDURE — 85027 COMPLETE CBC AUTOMATED: CPT

## 2024-05-15 PROCEDURE — 99232 SBSQ HOSP IP/OBS MODERATE 35: CPT | Performed by: CLINICAL NURSE SPECIALIST

## 2024-05-15 RX ORDER — AMLODIPINE BESYLATE 5 MG/1
2.5 TABLET ORAL DAILY
Status: DISCONTINUED | OUTPATIENT
Start: 2024-05-15 | End: 2024-05-16

## 2024-05-15 RX ORDER — LORAZEPAM 2 MG/ML
0.5 INJECTION INTRAMUSCULAR ONCE
Status: COMPLETED | OUTPATIENT
Start: 2024-05-15 | End: 2024-05-15

## 2024-05-15 RX ORDER — MEMANTINE HYDROCHLORIDE 5 MG/1
5 TABLET ORAL 2 TIMES DAILY
Status: DISCONTINUED | OUTPATIENT
Start: 2024-05-15 | End: 2024-05-18 | Stop reason: HOSPADM

## 2024-05-15 RX ADMIN — AMLODIPINE BESYLATE 2.5 MG: 5 TABLET ORAL at 15:45

## 2024-05-15 RX ADMIN — CLOPIDOGREL BISULFATE 75 MG: 75 TABLET ORAL at 09:08

## 2024-05-15 RX ADMIN — SODIUM CHLORIDE, PRESERVATIVE FREE 10 ML: 5 INJECTION INTRAVENOUS at 09:29

## 2024-05-15 RX ADMIN — ASPIRIN 81 MG: 81 TABLET, CHEWABLE ORAL at 09:08

## 2024-05-15 RX ADMIN — ATORVASTATIN CALCIUM 40 MG: 40 TABLET, FILM COATED ORAL at 22:19

## 2024-05-15 RX ADMIN — ALBUTEROL SULFATE 2.5 MG: 2.5 SOLUTION RESPIRATORY (INHALATION) at 08:38

## 2024-05-15 RX ADMIN — MEMANTINE 5 MG: 5 TABLET ORAL at 22:19

## 2024-05-15 RX ADMIN — ALBUTEROL SULFATE 2.5 MG: 2.5 SOLUTION RESPIRATORY (INHALATION) at 16:12

## 2024-05-15 RX ADMIN — LORAZEPAM 0.5 MG: 2 INJECTION INTRAMUSCULAR; INTRAVENOUS at 00:54

## 2024-05-15 RX ADMIN — ENOXAPARIN SODIUM 40 MG: 100 INJECTION SUBCUTANEOUS at 09:29

## 2024-05-15 RX ADMIN — SODIUM CHLORIDE, PRESERVATIVE FREE 10 ML: 5 INJECTION INTRAVENOUS at 22:19

## 2024-05-15 NOTE — PLAN OF CARE
Problem: Safety - Medical Restraint  Goal: Remains free of injury from restraints (Restraint for Interference with Medical Device)  Description: INTERVENTIONS:  1. Determine that other, less restrictive measures have been tried or would not be effective before applying the restraint  2. Evaluate the patient's condition at the time of restraint application  3. Inform patient/family regarding the reason for restraint  4. Q2H: Monitor safety, psychosocial status, comfort, nutrition and hydration  Outcome: Progressing     Problem: Safety - Adult  Goal: Free from fall injury  Outcome: Progressing     Problem: Chronic Conditions and Co-morbidities  Goal: Patient's chronic conditions and co-morbidity symptoms are monitored and maintained or improved  Outcome: Progressing     Problem: Discharge Planning  Goal: Discharge to home or other facility with appropriate resources  Outcome: Progressing     Problem: Pain  Goal: Verbalizes/displays adequate comfort level or baseline comfort level  Outcome: Progressing

## 2024-05-15 NOTE — PLAN OF CARE
Problem: Safety - Medical Restraint  Goal: Remains free of injury from restraints (Restraint for Interference with Medical Device)  Description: INTERVENTIONS:  1. Determine that other, less restrictive measures have been tried or would not be effective before applying the restraint  2. Evaluate the patient's condition at the time of restraint application  3. Inform patient/family regarding the reason for restraint  4. Q2H: Monitor safety, psychosocial status, comfort, nutrition and hydration  5/15/2024 1756 by Geovanna Ricks RN  Outcome: Not Progressing  5/15/2024 0534 by Matilda Blake RN  Outcome: Progressing     Problem: Chronic Conditions and Co-morbidities  Goal: Patient's chronic conditions and co-morbidity symptoms are monitored and maintained or improved  5/15/2024 1756 by Geovanna Ricks RN  Outcome: Not Progressing  5/15/2024 0534 by Matilda Blake RN  Outcome: Progressing     Problem: Discharge Planning  Goal: Discharge to home or other facility with appropriate resources  5/15/2024 1756 by Geovanna Ricks RN  Outcome: Not Progressing  5/15/2024 0534 by Matilda Blake RN  Outcome: Progressing

## 2024-05-15 NOTE — PROGRESS NOTES
John Polo M.D.,[facility]  Ham Mendez D.O., [employer]INE., [facility]  Nataliia Castro M.D.  Judy Glass M.D.   JEFFERY CalderonO.        Daily Pulmonary Progress Note    Patient:  Monique Pacheco 89 y.o. female MRN: [id number]            Synopsis     We are following patient for right lower lobe partial lung collapse    \"CC\" hypoxia, AMS    Code status: DNR CCA      Subjective      Patient was seen and examined.  Lying in bed ripped out her IV.  Persistent AMS.  Cognition seems poor.  Patient was okay for puréed solids on swallow evaluation.  Not following directions well, consider maintaining n.p.o. status based on cognition and confusion.  Repeat chest x-ray minimal basilar atelectasis no collapse of lung.  No further intervention needed at this time.      Review of Systems:  Difficult to obtain due to aphasia and dysphagia speech garbled.    24-hour events:  None    Objective   OBJECTIVE:   BP (!) 149/86   Pulse (!) 119   Temp 98.6 °F (37 °C) (Axillary)   Resp 21   Ht 1.575 m (5' 2\")   Wt 70.3 kg (155 lb)   SpO2 90%   BMI 28.35 kg/m²   SpO2 Readings from Last 1 Encounters:   05/15/24 90%        I/O:  No intake or output data in the 24 hours ending 05/15/24 1347                   CURRENT MEDS :  Scheduled Meds:   albuterol  2.5 mg Nebulization 4x Daily RT    clopidogrel  75 mg Oral Daily    sodium chloride flush  5-40 mL IntraVENous 2 times per day    enoxaparin  40 mg SubCUTAneous Daily    atorvastatin  40 mg Oral Nightly    aspirin  81 mg Oral Daily    Or    aspirin  300 mg Rectal Daily       Physical Exam:  General Appearance: appears comfortable in no acute distress.   HEENT: Normocephalic atraumatic without obvious abnormality   Neck: Lips, mucosa, and tongue normal.  Supple, symmetrical, trachea midline, no adenopathy;thyroid:  no enlargement/tenderness/nodules or JVD.  Lung: Breath sounds CTA. Respirations   unlabored. Symmetrical expansion.  Heart: RRR, normal S1, S2. No MRG  Abdomen:  does state that they are not agreeable to PEG placement as that would go against Monique's wishes   DNR CCA  Plavix x 21 days, then 81 mg aspirin for further stroke prevention per neurology recommendations  Pulmonary service to sign off call again if needed.    This plan of care was reviewed in collaboration with Dr. Glass    Electronically signed by SUSANA Hagan CNP on 5/15/2024 at 1:47 PM    This is confirmation that I have personally performed a substantial portion of medical decision making (>50%) related to this patient encounter.  The medications & laboratory data and imagery were discussed and adjusted where necessary. Key issues of the case were discussed among consultants.  Review of CNP documentation was conducted and revisions were made as appropriate. I agree with the above documented exam, problem list and plan of care with the following additions:    CXR looks good  On room air  Pulmonary will sign off please call if needed    Judy Glass MD

## 2024-05-15 NOTE — CARE COORDINATION
Had MBSS yesterday   Pureed consistency solids  with  thin liquids Per nursing  today - very agitated and has left wrist restraint. Call placed to son Lloyd to ask about discharge plan - will be here this pm.They did speak with Fanny COE From Palliative care also.Asking about pleasure feed waiver for snf They want to try rehab - will have snf choices this pm and are asking why she was not on namemda as at home - message to attending NP. Did explain she needs to be restraint free for snf. Bedside RN updated.Electronically signed by Shanna Bravo RN on 5/15/2024 at 12:54 PM    Snf choices per son and daughter in law- 1 [facility] - await determination 3. [facility]  asked for pt ot updates tomorrow. Cm/sw to follow.Electronically signed by Shanna Bravo RN on 5/15/2024 at 2:36 PM    The Plan for Transition of Care is related to the following treatment goals: rehab    The Patient and/or patient representative nupur Coelho  was provided with a choice of provider and agrees with the discharge plan. [x] Yes [] No    Freedom of choice list was provided with basic dialogue that supports the patient's individualized plan of care/goals, treatment preferences and shares the quality data associated with the providers. [x] Yes [] No

## 2024-05-15 NOTE — ACP (ADVANCE CARE PLANNING)
Advance Care Planning     Palliative Team Advance Care Planning (ACP) Conversation    Date of Conversation: 05/15/24    Individuals present for the conversation: Legal healthcare agent named below and Leonel Bolivar     ACP documents on file prior to discussion:  -None    Previously completed document/s not on file: Not discussed.    Healthcare Decision Maker:    Primary Decision Maker: LEONEL BOLIVAR - Other Relative - [phone]     Conversation Summary:  Pt has HCPOA and Living Will in soft chart.    Resuscitation Status:   Code Status: DNR-CCA     Documentation Completed:  -No new documents completed.    I spent 10 minutes with the patient and/or surrogate decision maker discussing the patient's wishes and goals.      TIFFANY Dye

## 2024-05-15 NOTE — PROGRESS NOTES
[facility]                                Progress note    Subjective:    The patient is awake and alert.    Agitated and confused    Objective:    BP (!) 149/86   Pulse (!) 119   Temp 98.6 °F (37 °C) (Axillary)   Resp 21   Ht 1.575 m (5' 2\")   Wt 70.3 kg (155 lb)   SpO2 90%   BMI 28.35 kg/m²     No intake/output data recorded.  No intake/output data recorded.    General appearance: NAD, conversant, confused, expressive aphasia  HEENT: AT/NC, MMM  Neck: FROM, supple  Lungs: Clear to auscultation  CV: RRR, no MRGs  Vasc: Radial pulses 2+  Abdomen: Soft, non-tender; no masses or HSM  Extremities: No peripheral edema or digital cyanosis  Skin: no rash, lesions or ulcers  Psych: Alert and oriented to person  Neuro: Alert and interactive     Recent Labs     05/13/24  0611 05/14/24  0444 05/15/24  0452   WBC 7.8 6.6 6.6   HGB 14.9 13.9 13.4   HCT 45.1 42.7 41.5    191 205         Recent Labs     05/13/24  0611 05/14/24  0444 05/15/24  0452    136 137   K 4.0 3.7 3.8    100 99   CO2 22 23 24   BUN 9 12 13   CREATININE 0.7 0.7 0.8   CALCIUM 9.4 8.9 9.2         Assessment:    Principal Problem:    Acute CVA (cerebrovascular accident) (HCC)  Active Problems:    Palliative care encounter    Goals of care, counseling/discussion    Anticoagulation goal of INR 1.5 to 2.5    Acute ischemic stroke (HCC)  Resolved Problems:    * No resolved hospital problems. *      Plan:    Patient is a 89-year-old female admitted to [facility] for  Acute CVA  -MBS to, passed with puréed foods and thin liquids  -DC IVF given elevated Bps  -May need additional antihypertensive maintenance therapy, however continue to monitor BPs for now  -PT/OT 10/24, await further discussion with case management and family to determine discharge planning  -Continue DAPT x 21 days with ASA monotherapy thereafter  -Continue statin  -Pulmonology following  -Echo declined by family    5/15/24:  -Although past barium  Adequate: hears normal conversation without difficulty

## 2024-05-15 NOTE — PROGRESS NOTES
Patient extremely agitated at this time, pulled at 2 IV sites, will not keep on oxygen or heart monitor. While attempting to redirect patient she is hitting and kicking, and attempted to throw heart monitor at staff.

## 2024-05-15 NOTE — PROGRESS NOTES
Discussed patient today during the ALD / pre-lung transplant meeting.  Instructions received from Dr. Arango for a follow-up appointment in 3 months with PFTs and a 6 minute walk test.     Messaged Marilou Saleem NP regarding IV fluids and downgrade.    No IV fluids at this time and okay to downgrade, orders placed

## 2024-05-15 NOTE — PROGRESS NOTES
Patient continues to be not be redirectable at this time, soft left wrist restraint placed for patient safety.    Attempted to call son Lloyd to update, no answer. Daughter Melissa was updated on patient condition.

## 2024-05-16 ENCOUNTER — APPOINTMENT (OUTPATIENT)
Age: 89
End: 2024-05-16
Attending: PSYCHIATRY & NEUROLOGY
Payer: MEDICARE

## 2024-05-16 LAB
ECHO AO ASC DIAM: 2.9 CM
ECHO AO ASCENDING AORTA INDEX: 1.69 CM/M2
ECHO AV AREA PEAK VELOCITY: 2.4 CM2
ECHO AV AREA VTI: 2.5 CM2
ECHO AV AREA/BSA PEAK VELOCITY: 1.4 CM2/M2
ECHO AV AREA/BSA VTI: 1.5 CM2/M2
ECHO AV CUSP MM: 1.8 CM
ECHO AV MEAN GRADIENT: 4 MMHG
ECHO AV MEAN VELOCITY: 1 M/S
ECHO AV PEAK GRADIENT: 7 MMHG
ECHO AV PEAK VELOCITY: 1.3 M/S
ECHO AV VELOCITY RATIO: 0.77
ECHO AV VTI: 25.8 CM
ECHO BSA: 1.75 M2
ECHO EST RA PRESSURE: 3 MMHG
ECHO LA DIAMETER INDEX: 2.33 CM/M2
ECHO LA DIAMETER: 4 CM
ECHO LA VOL A-L A2C: 48 ML (ref 22–52)
ECHO LA VOL A-L A4C: 67 ML (ref 22–52)
ECHO LA VOL MOD A2C: 46 ML (ref 22–52)
ECHO LA VOL MOD A4C: 62 ML (ref 22–52)
ECHO LA VOLUME AREA LENGTH: 61 ML
ECHO LA VOLUME INDEX A-L A2C: 28 ML/M2 (ref 16–34)
ECHO LA VOLUME INDEX A-L A4C: 39 ML/M2 (ref 16–34)
ECHO LA VOLUME INDEX AREA LENGTH: 35 ML/M2 (ref 16–34)
ECHO LA VOLUME INDEX MOD A2C: 27 ML/M2 (ref 16–34)
ECHO LA VOLUME INDEX MOD A4C: 36 ML/M2 (ref 16–34)
ECHO LV FRACTIONAL SHORTENING: 33 % (ref 28–44)
ECHO LV INTERNAL DIMENSION DIASTOLE INDEX: 2.5 CM/M2
ECHO LV INTERNAL DIMENSION DIASTOLIC: 4.3 CM (ref 3.9–5.3)
ECHO LV INTERNAL DIMENSION SYSTOLIC INDEX: 1.69 CM/M2
ECHO LV INTERNAL DIMENSION SYSTOLIC: 2.9 CM
ECHO LV ISOVOLUMETRIC RELAXATION TIME (IVRT): 79.6 MS
ECHO LV IVSD: 1 CM (ref 0.6–0.9)
ECHO LV IVSS: 1.1 CM
ECHO LV MASS 2D: 132.7 G (ref 67–162)
ECHO LV MASS INDEX 2D: 77.2 G/M2 (ref 43–95)
ECHO LV POSTERIOR WALL DIASTOLIC: 0.9 CM (ref 0.6–0.9)
ECHO LV POSTERIOR WALL SYSTOLIC: 1 CM
ECHO LV RELATIVE WALL THICKNESS RATIO: 0.42
ECHO LVOT AREA: 3.1 CM2
ECHO LVOT AV VTI INDEX: 0.79
ECHO LVOT DIAM: 2 CM
ECHO LVOT MEAN GRADIENT: 2 MMHG
ECHO LVOT PEAK GRADIENT: 4 MMHG
ECHO LVOT PEAK VELOCITY: 1 M/S
ECHO LVOT STROKE VOLUME INDEX: 37.4 ML/M2
ECHO LVOT SV: 64.4 ML
ECHO LVOT VTI: 20.5 CM
ECHO MV "A" WAVE DURATION: 131.5 MSEC
ECHO MV A VELOCITY: 1.26 M/S
ECHO MV AREA PHT: 5.4 CM2
ECHO MV AREA VTI: 2.4 CM2
ECHO MV E DECELERATION TIME (DT): 220.1 MS
ECHO MV E VELOCITY: 0.83 M/S
ECHO MV E/A RATIO: 0.66
ECHO MV LVOT VTI INDEX: 1.29
ECHO MV MAX VELOCITY: 1.4 M/S
ECHO MV MEAN GRADIENT: 4 MMHG
ECHO MV MEAN VELOCITY: 1 M/S
ECHO MV PEAK GRADIENT: 7 MMHG
ECHO MV PRESSURE HALF TIME (PHT): 41 MS
ECHO MV VTI: 26.4 CM
ECHO PV MAX VELOCITY: 1.1 M/S
ECHO PV MEAN GRADIENT: 2 MMHG
ECHO PV MEAN VELOCITY: 0.8 M/S
ECHO PV PEAK GRADIENT: 5 MMHG
ECHO PV VTI: 18.9 CM
ECHO PVEIN A DURATION: 152.3 MS
ECHO PVEIN A VELOCITY: 0.4 M/S
ECHO PVEIN PEAK D VELOCITY: 0.3 M/S
ECHO PVEIN PEAK S VELOCITY: 0.4 M/S
ECHO PVEIN S/D RATIO: 1.3
ECHO RIGHT VENTRICULAR SYSTOLIC PRESSURE (RVSP): 36 MMHG
ECHO RV INTERNAL DIMENSION: 2.9 CM
ECHO TV REGURGITANT MAX VELOCITY: 2.86 M/S
ECHO TV REGURGITANT PEAK GRADIENT: 33 MMHG
GLUCOSE BLD-MCNC: 126 MG/DL (ref 74–99)

## 2024-05-16 PROCEDURE — 97535 SELF CARE MNGMENT TRAINING: CPT

## 2024-05-16 PROCEDURE — 6370000000 HC RX 637 (ALT 250 FOR IP): Performed by: NURSE PRACTITIONER

## 2024-05-16 PROCEDURE — 92507 TX SP LANG VOICE COMM INDIV: CPT

## 2024-05-16 PROCEDURE — 6360000002 HC RX W HCPCS: Performed by: NURSE PRACTITIONER

## 2024-05-16 PROCEDURE — 1200000000 HC SEMI PRIVATE

## 2024-05-16 PROCEDURE — 93306 TTE W/DOPPLER COMPLETE: CPT | Performed by: INTERNAL MEDICINE

## 2024-05-16 PROCEDURE — 94640 AIRWAY INHALATION TREATMENT: CPT

## 2024-05-16 PROCEDURE — 93306 TTE W/DOPPLER COMPLETE: CPT

## 2024-05-16 PROCEDURE — 92526 ORAL FUNCTION THERAPY: CPT

## 2024-05-16 PROCEDURE — 6370000000 HC RX 637 (ALT 250 FOR IP): Performed by: PHYSICIAN ASSISTANT

## 2024-05-16 PROCEDURE — 82962 GLUCOSE BLOOD TEST: CPT

## 2024-05-16 PROCEDURE — 2580000003 HC RX 258: Performed by: NURSE PRACTITIONER

## 2024-05-16 PROCEDURE — 97530 THERAPEUTIC ACTIVITIES: CPT

## 2024-05-16 PROCEDURE — 99231 SBSQ HOSP IP/OBS SF/LOW 25: CPT | Performed by: NURSE PRACTITIONER

## 2024-05-16 RX ORDER — AMLODIPINE BESYLATE 5 MG/1
5 TABLET ORAL DAILY
Status: DISCONTINUED | OUTPATIENT
Start: 2024-05-17 | End: 2024-05-18 | Stop reason: HOSPADM

## 2024-05-16 RX ORDER — AMLODIPINE BESYLATE 5 MG/1
2.5 TABLET ORAL ONCE
Status: COMPLETED | OUTPATIENT
Start: 2024-05-16 | End: 2024-05-16

## 2024-05-16 RX ADMIN — CLOPIDOGREL BISULFATE 75 MG: 75 TABLET ORAL at 10:20

## 2024-05-16 RX ADMIN — AMLODIPINE BESYLATE 2.5 MG: 5 TABLET ORAL at 17:15

## 2024-05-16 RX ADMIN — ATORVASTATIN CALCIUM 40 MG: 40 TABLET, FILM COATED ORAL at 20:46

## 2024-05-16 RX ADMIN — AMLODIPINE BESYLATE 2.5 MG: 5 TABLET ORAL at 10:20

## 2024-05-16 RX ADMIN — ALBUTEROL SULFATE 2.5 MG: 2.5 SOLUTION RESPIRATORY (INHALATION) at 12:46

## 2024-05-16 RX ADMIN — SODIUM CHLORIDE, PRESERVATIVE FREE 10 ML: 5 INJECTION INTRAVENOUS at 23:46

## 2024-05-16 RX ADMIN — SODIUM CHLORIDE, PRESERVATIVE FREE 10 ML: 5 INJECTION INTRAVENOUS at 10:20

## 2024-05-16 RX ADMIN — POLYETHYLENE GLYCOL 3350 17 G: 17 POWDER, FOR SOLUTION ORAL at 20:46

## 2024-05-16 RX ADMIN — MEMANTINE 5 MG: 5 TABLET ORAL at 20:46

## 2024-05-16 RX ADMIN — MEMANTINE 5 MG: 5 TABLET ORAL at 10:20

## 2024-05-16 RX ADMIN — ENOXAPARIN SODIUM 40 MG: 100 INJECTION SUBCUTANEOUS at 10:19

## 2024-05-16 RX ADMIN — ALBUTEROL SULFATE 2.5 MG: 2.5 SOLUTION RESPIRATORY (INHALATION) at 09:13

## 2024-05-16 RX ADMIN — ALBUTEROL SULFATE 2.5 MG: 2.5 SOLUTION RESPIRATORY (INHALATION) at 19:28

## 2024-05-16 RX ADMIN — ASPIRIN 81 MG: 81 TABLET, CHEWABLE ORAL at 10:21

## 2024-05-16 ASSESSMENT — PAIN SCALES - GENERAL
PAINLEVEL_OUTOF10: 0
PAINLEVEL_OUTOF10: 0

## 2024-05-16 NOTE — PROGRESS NOTES
PT Treatment Note    Name: Monique Pacheco  : 1932  MRN: [id number]    Date of Service: 2024    Evaluating PT:  Lloyd Garzon, PT [id number]    Room #:  8509/8509-A  Diagnosis:  Acute Ischemic Stroke  Surgery: None  History reviewed. No pertinent past medical history.   History reviewed. No pertinent surgical history.  Precautions: Falls, alarm, dementia, , and expressive aphasia, R sided weakness, TSM  Equipment Needs:  To be determined    Unable to get PLOF and prior living arrangements due to patient's expressive aphasia and none present in chart upon review.     Initial Evaluation  Date:  Treatment  Date: 2024 Short Term/ Long Term   Goals   AM-PAC 6 Clicks 10/24 11/24    Was pt agreeable to Eval/treatment? Yes Yes    Does pt have pain? No No c/o pain    Bed Mobility  Rolling: Max  Supine to sit: Max  Sit to supine: Max  Scooting: Max Rolling: ModA (L); Evert (R)  Supine to sit: MaxA  Sit to supine: NT  Scooting: NT Rolling: Min  Supine to sit: Min  Sit to supine: Min  Scooting: Min   Transfers Sit to stand: Max  Stand to sit: Max  Stand pivot: NT Sit to stand: ModA  Stand to sit: ModA  Stand pivot: MaxA x2 with no AD Sit to stand: Min with AD  Stand to sit: Min with AD  Stand pivot: Min with AD   Ambulation    NT 5 feet with no AD MaxA x2 + chair follow 25 feet with FWW Mod   Stair negotiation: ascended and descended  NT NT TBD   ROM BUE:  WFL  BLE:  decreased DF BLmost likely premorbid     Strength BUE: Grossly LUE 3+/5            Grossly RUE 2/5  RLE: Grossly 2/5  LLE:   Grossly 4-/5   4/5   Balance Sitting EOB:  Min  Dynamic Standing:  Max Sitting EOB: SBA <> Evert  Dynamic Standing: MaxA x2 with no AD Sitting EOB:  Ind  Dynamic Standing:  Min     Pt is A & O x 3  Sensation:  No reports of numbness/tingling to extremities  Edema:  Unremarkable    Vitals:   HR 84, SpO2 98% with activity.    Patient education  Pt educated on safety during functional mobility, use of call light for

## 2024-05-16 NOTE — CARE COORDINATION
Spoke with [facility] yesterday afternoon- can accept - needs to be restraint and sitter free for 24 hours. Asked for updated PT/OT today and will need precert Electronically signed by Shanna Bravo RN on 5/16/2024 at 7:27 AM    Altagracia diana assistant ntfd of above for precert.Electronically signed by Shanna Bravo RN on 5/16/2024 at 7:58 AM    Envelope ambulance form and PASRR completed and in soft chart.Electronically signed by Shanna Bravo RN on 5/16/2024 at 8:49 AM     Message to [facility] S to start precert after updated PT/OT and per nursing in rounds is not restrained and much more cooperative today.Electronically signed by Shanna Bravo RN on 5/16/2024 at 9:01 AM    Daughter in law Rose ntfd of above.Electronically signed by Shanna Bravo RN on 5/16/2024 at 9:34 AM    Ntjensen Read that pt/ot notes are in to start precert.Electronically signed by Shanna Bravo RN on 5/16/2024 at 11:55 AM    Spoke with family @ bedside and in agreement with above discharge plan.Electronically signed by Shanna Bravo RN on 5/16/2024 at 2:28 PM

## 2024-05-16 NOTE — PROGRESS NOTES
Palliative Care Department  Progress Note  Shy Villegas, APRN-DAINCA    Monique Pacheco  [id number]  Hospital Day: 5  Date of Initial Consult: 5/13/2024  Referring Provider: Milan Freedman DO   Palliative Medicine was consulted for assistance with: Goals of Care    HPI:   Monique Pacheco is a 89 y.o. with a medical history of dementia who was admitted on 5/12/2024 from home with a CHIEF COMPLAINT of confusion.  Patient was initially seen at [facility] for stroke like symptoms and transferred to [facility].  MRI showed acute lacunar infarct involving the left corpus striatum. Patient not a candidate for TNK.  Palliative medicine consulted for goals of care.  ASSESSMENT/PLAN:     Pertinent Hospital Diagnoses   Acute CVA  Dysphagia    Palliative Care Encounter / Counseling Regarding Goals of Care  Please see detailed goals of care discussion as below  At this time, Monique Pacheco, Does Not have capacity for medical decision-making.  Capacity is time limited and situation/question specific  During encounter Lloyd  was surrogate medical decision-maker  Outcome of goals of care meeting:   DNR CCA  Family awaiting neurology input  Family would not be agreeable to PEG placement if needed  Code status DNR-CCA  Advanced Directives: has POA or living will in epic in soft chart  Surrogate/Legal NOK:  (HCPOA) Lloyd Eng, son, [phone], [phone]  Jelly Garnett, child, [phone], [phone]  Melissa Santos, child, [phone]    Spiritual assessment: no spiritual distress identified  Bereavement and grief: to be determined  Referrals to: none today  SUBJECTIVE:     Current medical issues leading to Palliative Medicine involvement include   Active Hospital Problems    Diagnosis Date Noted    Anticoagulation goal of INR 1.5 to 2.5 [Z51.81, Z79.01] 05/15/2024    Acute ischemic stroke (HCC) [I63.9] 05/15/2024    Palliative care encounter [Z51.5] 05/13/2024    Goals of care, counseling/discussion [Z71.89] 05/13/2024

## 2024-05-16 NOTE — PROGRESS NOTES
SPEECH LANGUAGE PATHOLOGY  DAILY PROGRESS NOTE        PATIENT NAME:  Monique Pacheco      :  1932          TODAY'S DATE:  2024 ROOM:  8509/8509-A    Patient seen in room for skilled speech/lang tx. Patient alert to self only. Patient's speech intelligibility slightly improved since evaluation. Patient benefits from slow rate and over articulation and was able to utilize strategies with min verbal cues provided by SLP during conversational tasks. Patient able to answer open-ended questions with 75% acc w/ min verbal cues required.       CPT code(s) 91053  speech/language tx  Total minutes :  10 minutes        Patient seen in room for skilled dysphagia tx. Patient cleared by RN for tx. Patient received applesauce and thin water via tsp and cup. Patient oral stage exhibited min oral stasis after 1st swl that cleared w/ 2nd swl and/or liquid wash, fair bolus formation;manipulation, adequate mastication time, and fair AP tongue propulsion. Patient pharyngeal stage exhibited no overt s/s of pen/aspiration. Patient will good use of small cup sips. Continue with current diet. Will cont w/ POC.     [id number]  dysphagia tx  Total minutes: 10 minutes      Soila Gonzales M.S., CCC-SLP  Speech-Language Pathologist  SP. [phone]

## 2024-05-16 NOTE — PROGRESS NOTES
[facility]                                Progress note    Subjective:    Much more alert today  Sitting up in a chair  Asking to use the restroom and pointing to the spine that states call do not fall while also pointing to her phone  Appears to be much more aware of her surroundings today    Objective:    BP (!) 142/92   Pulse (!) 101   Temp 98.3 °F (36.8 °C) (Axillary)   Resp 18   Ht 1.575 m (5' 2\")   Wt 70.3 kg (155 lb)   SpO2 98%   BMI 28.35 kg/m²     In: 10 [I.V.:10]  Out: -   In: 10   Out: -     General appearance: NAD, conversant, confused, expressive aphasia, improving significantly  HEENT: AT/NC, MMM  Neck: FROM, supple  Lungs: Clear to auscultation  CV: RRR, no MRGs  Vasc: Radial pulses 2+  Abdomen: Soft, non-tender; no masses or HSM  Extremities: No peripheral edema or digital cyanosis  Skin: no rash, lesions or ulcers  Psych: Alert and oriented to person  Neuro: Alert and interactive     Recent Labs     05/14/24  0444 05/15/24  0452   WBC 6.6 6.6   HGB 13.9 13.4   HCT 42.7 41.5    205         Recent Labs     05/14/24  0444 05/15/24  0452    137   K 3.7 3.8    99   CO2 23 24   BUN 12 13   CREATININE 0.7 0.8   CALCIUM 8.9 9.2         Assessment:    Principal Problem:    Acute CVA (cerebrovascular accident) (HCC)  Active Problems:    Palliative care encounter    Goals of care, counseling/discussion    Anticoagulation goal of INR 1.5 to 2.5    Acute ischemic stroke (HCC)  Resolved Problems:    * No resolved hospital problems. *      Plan:    Patient is a 89-year-old female admitted to [facility] for  Acute CVA  -MBS to, passed with puréed foods and thin liquids  -DC IVF given elevated Bps  -May need additional antihypertensive maintenance therapy, however continue to monitor BPs for now  -PT/OT 10/24, await further discussion with case management and family to determine discharge planning  -Continue DAPT x 21 days with ASA monotherapy thereafter  -Continue  statin  -Pulmonology following  -Echo declined by family    5/15/24:  -Although past barium swallow patient has not been eating or drinking per nursing  -Family would like to try ROSALINDA  -Namenda ordered  -Has been weaned off supplemental O2 satting 91% on room air  -Pulmonology signed off  -Improving BPs since discontinuation of IV fluids  -May need small dose antihypertensive for better BP control  -Requiring wrist restraints as patient is pulling at lines  -Await final discharge plans per case management    5/16/24:  -BPs improving however still mildly elevated, will increase Norvasc to 5 mg daily  -Patient will require authorization to rehab  -Patient has been taken out of restraints  -Much more alert and oriented  -Discharge planning when authorization for rehab has been obtained    Code status: DNR-CCA  Consultants: Pulmonology and neurology  DVT Prophylaxis   PT/OT  Discharge planning       I have spent a total time of 30 minutes of this patient encounter reviewing chart, labs, coordinating care with interdisciplinary teams, face to face encounter with patient, providing counseling/education to patient/family.      Marilou Saleem, SUSANA - CNP  1:02 PM  5/16/2024

## 2024-05-16 NOTE — PROGRESS NOTES
Occupational Therapy  OT BEDSIDE TREATMENT NOTE   [facility]  [address], [place]       Date:2024  Patient Name: Monique Pacheco  MRN: [id number]  : 1932  Room: [address] [place] [address]-A     Per OT Eval:    Evaluating OT: Steve Prather OTR/L [id number]     Referring Provider: Marilou Saleem APRN - CNP                                       Specific Provider Orders/Date: OT evaluation and treatment 24 1145     Diagnosis:  Acute ischemic stroke (HCC) [I63.9]  Acute CVA (cerebrovascular accident) (HCC) [I63.9]       Pertinent Medical History:  has no past medical history on file.   Past Surgical History   History reviewed. No pertinent surgical history.        Pt admitted to the hospital  with confusion   Pt had RRT on  with R weakness and decreased responsiveness   MRI brain : IMPRESSION:  1. Acute lacunar infarct involving the left corpus striatum. No acute  hemorrhage or significant mass effect.  2. Several foci of susceptibility throughout the right greater than left  parietal lobes and few scattered foci throughout the posterior fossa, either  reflecting sequelae of prior microhemorrhage or may reflect underlying  cerebral amyloidosis.     Pt pulled out Ng tube  early AM       Orders received, chart reviewed, eval complete.      Precautions:  Fall Risk, aphasia, R sided weakness, +alarms, purwick ,maintain SpO2 saturations      Assessment of current deficits   [x] Functional mobility             [x]ADLs           [x] Strength                  [x]Cognition   [x] Functional transfers           [] IADLs         [x] Safety Awareness   [x]Endurance   [x] Fine Motor Coordination    [x] Balance      [] Vision/perception    []Sensation     [x] Gross Motor Coordination [x] ROM          [] Delirium                  [] Motor Control      OT PLAN OF CARE   OT POC based on physician orders, patient diagnosis and results of clinical  ambulate without assist/ requires assist with ADLs  4             Moderate/Severe disability;requires assist to ambulate/assist with ADLs  5             Severe disability;bedridden/incontinent   6               Score:   5        Recommended Adaptive Equipment: TBD       Pt is a poor  historian and demonstrated difficulty expressing herself - family present at start of session and able to assist with PLOF   Home Living:  Pt lives with family - dtr   in a 2 story house with 1 + 1 steps to enter    Bedroom setup: main level  standard bed   Bathroom setup: main level  1/2 bath on the main level   WIS in basement with shower chair   Pt has a basement   Equipment owned:   shower chair      Prior Level of Function:  Pt I with most ADLs , except A for bathing, A with IADLs, and completed functional mobility with no AD   Falls: no   Driving: no   Occupation/leisure: reading, taking naps, and talking to her dtr     Pain Level: no pain      Cognition: A&O: x 4, pleasant & cooperative, making some confusing statements, asking if her daughter was in the bed next to her? Otherwise able to recall current situation, slurred speech therefore difficult to understand,  very motivated               Follows single step directions: Good              Memory: Fair -              Sequencing: Fair              Problem solving: Fair -              Judgement/safety: Fair -              Attention:  Fair      Functional Assessment: AM-PAC Inpatient Daily Activity Raw Score:        Initial Eval Status  Date: 2024   Treatment Status  Date:  24 STG=LTG  Time frame: 10-14 days   Feeding NT  NPO   SBA  Recommending up in chair for meals, breakfast tray set up  Set up   Grooming Max A  Hair care and oral care   Pt tries to use RUE - max A with hand over hand   Edu on uses LUE as compensatory strategies with fair+ teach back   Min/Mod A  Using L UE, attempting to use R UE with poor results, unable to maintain grasp due to

## 2024-05-16 NOTE — PROGRESS NOTES
BASOSABS 0.03 05/12/2024 08:44 AM     CMP:    Lab Results   Component Value Date/Time     05/15/2024 04:52 AM    K 3.8 05/15/2024 04:52 AM    CL 99 05/15/2024 04:52 AM    CO2 24 05/15/2024 04:52 AM    BUN 13 05/15/2024 04:52 AM    CREATININE 0.8 05/15/2024 04:52 AM    LABGLOM 72 05/15/2024 04:52 AM    LABGLOM >60 05/27/2023 12:30 PM    GLUCOSE 112 05/15/2024 04:52 AM    CALCIUM 9.2 05/15/2024 04:52 AM    BILITOT 0.5 05/12/2024 08:44 AM    ALKPHOS 67 05/12/2024 08:44 AM    AST 11 05/12/2024 08:44 AM    ALT 6 05/12/2024 08:44 AM     HgBA1c:    Lab Results   Component Value Date/Time    LABA1C 5.7 05/13/2024 06:11 AM     FLP:    Lab Results   Component Value Date/Time    TRIG 73 05/13/2024 06:11 AM    HDL 54 05/13/2024 06:11 AM     Lab Results   Component Value Date     (H) 05/13/2024         CT head wo contrast:  No acute intracranial abnormality.     CTAs head and neck:  1. No evidence of arterial injury in the neck.  2. Calcified plaques at the origin and proximal segment of bilateral internal  carotid arteries with 30% stenosis on the right and 50% stenosis on the left.  3. Degenerative disc disease in the cervical spine.  4. 1 cm left thyroid nodule. No decompensation for follow-up evaluation of  thyroid nodule.    MRI brain wo contrast:  1. Acute lacunar infarct involving the left corpus striatum. No acute  hemorrhage or significant mass effect.  2. Several foci of susceptibility throughout the right greater than left  parietal lobes and few scattered foci throughout the posterior fossa, either  reflecting sequelae of prior microhemorrhage or may reflect underlying  cerebral amyloidosis.      All pertinent labs and images personally reviewed today    Assessment:     L MCA territory infarct. No significant stenosis noted on CTAs, but plaque is noted in aortic arch. Echo was initially declined by family, but now they would like this to be completed. There is evidence of microhemorrhages noted on GRE  sequence concerning for CAA and this was conveyed to the daughter at this time with discussion of an increased risk of bleeding and potentially not a good candidate for anticoagulation even if a cardioembolic source could be identified    Plan:     DAPT 21 days followed by ASA monotherapy     Statin therapy with goal LDL < 70    Family now would like echo completed - however I did discuss with daughter that her mother's MRI does show several small microhemorrhages concerning for CAA and that she may not be a good candidate for anticoagulation if it would be deemed appropriate. She would like to talk with her brother about this further     Therapies     Breonna Nowak PA-C  12:55 PM  5/16/2024

## 2024-05-17 VITALS
WEIGHT: 155 LBS | BODY MASS INDEX: 28.52 KG/M2 | TEMPERATURE: 97.6 F | HEIGHT: 62 IN | DIASTOLIC BLOOD PRESSURE: 52 MMHG | SYSTOLIC BLOOD PRESSURE: 140 MMHG | OXYGEN SATURATION: 92 % | RESPIRATION RATE: 18 BRPM | HEART RATE: 83 BPM

## 2024-05-17 PROCEDURE — 2580000003 HC RX 258: Performed by: NURSE PRACTITIONER

## 2024-05-17 PROCEDURE — 92507 TX SP LANG VOICE COMM INDIV: CPT

## 2024-05-17 PROCEDURE — 6360000002 HC RX W HCPCS: Performed by: NURSE PRACTITIONER

## 2024-05-17 PROCEDURE — 92526 ORAL FUNCTION THERAPY: CPT

## 2024-05-17 PROCEDURE — 99233 SBSQ HOSP IP/OBS HIGH 50: CPT | Performed by: CLINICAL NURSE SPECIALIST

## 2024-05-17 PROCEDURE — 6370000000 HC RX 637 (ALT 250 FOR IP): Performed by: PHYSICIAN ASSISTANT

## 2024-05-17 PROCEDURE — 6370000000 HC RX 637 (ALT 250 FOR IP): Performed by: NURSE PRACTITIONER

## 2024-05-17 PROCEDURE — 94640 AIRWAY INHALATION TREATMENT: CPT

## 2024-05-17 PROCEDURE — 1200000000 HC SEMI PRIVATE

## 2024-05-17 RX ORDER — CLOPIDOGREL BISULFATE 75 MG/1
75 TABLET ORAL DAILY
Qty: 17 TABLET | Refills: 0 | DISCHARGE
Start: 2024-05-18 | End: 2024-06-04

## 2024-05-17 RX ORDER — ATORVASTATIN CALCIUM 40 MG/1
40 TABLET, FILM COATED ORAL NIGHTLY
Qty: 30 TABLET | Refills: 3 | DISCHARGE
Start: 2024-05-17

## 2024-05-17 RX ORDER — AMLODIPINE BESYLATE 5 MG/1
5 TABLET ORAL DAILY
Qty: 30 TABLET | Refills: 3 | DISCHARGE
Start: 2024-05-18

## 2024-05-17 RX ORDER — HYDROXYZINE PAMOATE 25 MG/1
25 CAPSULE ORAL ONCE
Status: COMPLETED | OUTPATIENT
Start: 2024-05-17 | End: 2024-05-17

## 2024-05-17 RX ORDER — ASPIRIN 81 MG/1
81 TABLET, CHEWABLE ORAL DAILY
Qty: 30 TABLET | Refills: 3 | DISCHARGE
Start: 2024-05-18

## 2024-05-17 RX ADMIN — ASPIRIN 81 MG: 81 TABLET, CHEWABLE ORAL at 09:24

## 2024-05-17 RX ADMIN — ENOXAPARIN SODIUM 40 MG: 100 INJECTION SUBCUTANEOUS at 09:24

## 2024-05-17 RX ADMIN — ALBUTEROL SULFATE 2.5 MG: 2.5 SOLUTION RESPIRATORY (INHALATION) at 09:10

## 2024-05-17 RX ADMIN — AMLODIPINE BESYLATE 5 MG: 5 TABLET ORAL at 09:24

## 2024-05-17 RX ADMIN — MEMANTINE 5 MG: 5 TABLET ORAL at 09:24

## 2024-05-17 RX ADMIN — ATORVASTATIN CALCIUM 40 MG: 40 TABLET, FILM COATED ORAL at 21:49

## 2024-05-17 RX ADMIN — SODIUM CHLORIDE, PRESERVATIVE FREE 10 ML: 5 INJECTION INTRAVENOUS at 09:26

## 2024-05-17 RX ADMIN — CLOPIDOGREL BISULFATE 75 MG: 75 TABLET ORAL at 09:24

## 2024-05-17 RX ADMIN — HYDROXYZINE PAMOATE 25 MG: 25 CAPSULE ORAL at 14:47

## 2024-05-17 RX ADMIN — MEMANTINE 5 MG: 5 TABLET ORAL at 21:49

## 2024-05-17 NOTE — PROGRESS NOTES
[facility]                                Progress note    Subjective:    On evaluation she is resting comfortably in no acute distress  She still is quite aphasic and not able to get words out appropriately  No family members at bedside  TeleSitter in place  Objective:    BP (!) 140/52   Pulse 83   Temp 97.6 °F (36.4 °C) (Temporal)   Resp 18   Ht 1.575 m (5' 2\")   Wt 70.3 kg (155 lb)   SpO2 92%   BMI 28.35 kg/m²     No intake/output data recorded.  No intake/output data recorded.    General appearance: NAD, conversant, confused, expressive aphasia, improving significantly  HEENT: AT/NC, MMM  Neck: FROM, supple  Lungs: Clear to auscultation  CV: RRR, no MRGs  Vasc: Radial pulses 2+  Abdomen: Soft, non-tender; no masses or HSM  Extremities: No peripheral edema or digital cyanosis  Skin: no rash, lesions or ulcers  Psych: Alert and oriented to person  Neuro: Alert and interactive     Recent Labs     05/15/24  0452   WBC 6.6   HGB 13.4   HCT 41.5            Recent Labs     05/15/24  0452      K 3.8   CL 99   CO2 24   BUN 13   CREATININE 0.8   CALCIUM 9.2         Assessment:    Principal Problem:    Acute CVA (cerebrovascular accident) (HCC)  Active Problems:    Palliative care encounter    Goals of care, counseling/discussion    Anticoagulation goal of INR 1.5 to 2.5    Acute ischemic stroke (HCC)  Resolved Problems:    * No resolved hospital problems. *      Plan:    Patient is a 89-year-old female admitted to [facility] for  Acute CVA  -MBS to, passed with puréed foods and thin liquids  -DC IVF given elevated Bps  -May need additional antihypertensive maintenance therapy, however continue to monitor BPs for now  -PT/OT 10/24, await further discussion with case management and family to determine discharge planning  -Continue DAPT x 21 days with ASA monotherapy thereafter  -Continue statin  -Pulmonology following  -Echo declined by family    5/15/24:  -Although past barium  swallow patient has not been eating or drinking per nursing  -Family would like to try ROSALINDA  -Namenda ordered  -Has been weaned off supplemental O2 satting 91% on room air  -Pulmonology signed off  -Improving BPs since discontinuation of IV fluids  -May need small dose antihypertensive for better BP control  -Requiring wrist restraints as patient is pulling at lines  -Await final discharge plans per case management    5/16/24:  -BPs improving however still mildly elevated, will increase Norvasc to 5 mg daily  -Patient will require authorization to rehab  -Patient has been taken out of restraints  -Much more alert and oriented  -Discharge planning when authorization for rehab has been obtained    5/17/24:  -Continue DAPT  -BPs have improved however still mildly elevated, will need monitored outpatient for adjustments to antihypertensives for tighter BP control  -Echo obtained > EF 60% with no evidence of PFO  -Awaiting pre-CERT to facility for further rehab-has been obtained, okay for discharge  -Will require aggressive speech therapy as outpatient  - Okay for discharge from medicine standpoint, continue PT OT and aggressive speech therapy as outpatient        Code status: DNR-CCA  Consultants: Pulmonology and neurology  DVT Prophylaxis   PT/OT  Discharge planning       I have spent a total time of 30 minutes of this patient encounter reviewing chart, labs, coordinating care with interdisciplinary teams, face to face encounter with patient, providing counseling/education to patient/family.      Maile North MD  5/17/2024

## 2024-05-17 NOTE — DISCHARGE INSTR - COC
Continuity of Care Form    Patient Name: Monique Pacheco   :  1932  MRN:  [id number]    Admit date:  2024  Discharge date:  24    Code Status Order: DNR-CCA   Advance Directives:     Admitting Physician:  Maile North MD  PCP: Corazon King DO    Discharging Nurse: SYLVIA  Discharging Hospital Unit/Room#: 8509/8509-A  Discharging Unit Phone Number: [phone]    Emergency Contact:   Extended Emergency Contact Information  Primary Emergency Contact: LEONEL BOLIVAR  Home Phone: [phone]  Mobile Phone: [phone]  Relation: Other Relative  Preferred language: English   needed? No  Secondary Emergency Contact: Jelly Garnett  Address: [address] [place] [address]            [place] [address] [facility]  Home Phone: [phone]  Mobile Phone: [phone]  Relation: Child    Past Surgical History:  History reviewed. No pertinent surgical history.    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 I65.22    Internal carotid artery stenosis, left I65.22    Ischemic cerebral stroke due to intracranial large artery atherosclerosis (HCC) I63.59    Acute CVA (cerebrovascular accident) (formerly [facility]) I63.9    Palliative care encounter Z51.5    Goals of care, counseling/discussion Z71.89    Anticoagulation goal of INR 1.5 to 2.5 Z51.81, Z79.01    Acute ischemic stroke (HCC) I63.9       Isolation/Infection:   Isolation            No Isolation          Patient Infection Status       None to display            Nurse Assessment:  Last Vital Signs: BP (!) 140/52   Pulse 83   Temp  97.6 °F (36.4 °C) (Temporal)   Resp 18   Ht 1.575 m (5' 2.01\")   Wt 70.3 kg (155 lb)   SpO2 92%   BMI 28.34 kg/m²     Last documented pain score (0-10 scale): Pain Level: 0  Last Weight:   Wt Readings from Last 1 Encounters:   05/12/24 70.3 kg (155 lb)     Mental Status:  oriented and alert    IV Access:  - None    Nursing Mobility/ADLs:  Walking   Assisted  Transfer  Assisted  Bathing  Assisted  Dressing  Assisted  Toileting  Assisted  Feeding  Assisted  Med Admin  Assisted  Med Delivery   whole    Wound Care Documentation and Therapy:        Elimination:  Continence:   Bowel: No  Bladder: No  Urinary Catheter: None   Colostomy/Ileostomy/Ileal Conduit: No       Date of Last BM: 5/17  No intake or output data in the 24 hours ending 05/17/24 1419  I/O last 3 completed shifts:  In: 10 [I.V.:10]  Out: -     Safety Concerns:     At Risk for Falls    Impairments/Disabilities:      Speech    Nutrition Therapy:  Current Nutrition Therapy:   - Oral Diet:  Dysphagia - Pureed    Routes of Feeding: Oral  Liquids: Thin Liquids  Daily Fluid Restriction: no  Last Modified Barium Swallow with Video (Video Swallowing Test): not done    Treatments at the Time of Hospital Discharge:   Respiratory Treatments: n/a  Oxygen Therapy:  is not on home oxygen therapy.  Ventilator:    - No ventilator support    Rehab Therapies: Physical Therapy and Occupational Therapy  Weight Bearing Status/Restrictions: No weight bearing restrictions  Other Medical Equipment (for information only, NOT a DME order):  wheelchair and walker  Other Treatments: n/a    Patient's personal belongings (please select all that are sent with patient):  None    RN SIGNATURE:  Electronically signed by Tara M Wilms, RN on 5/17/24 at 3:03 PM EDT    CASE MANAGEMENT/SOCIAL WORK SECTION    Inpatient Status Date: ***    Readmission Risk Assessment Score:  Readmission Risk              Risk of Unplanned Readmission:  12           Discharging to Facility/ Agency   Name:

## 2024-05-17 NOTE — PROGRESS NOTES
SPEECH LANGUAGE PATHOLOGY  DAILY PROGRESS NOTE        PATIENT NAME:  Monique Pacheco      ROOM:  8509/8509-A :  1932         TODAY'S DATE:  2024       Patient seen for dysarthria, dysphagia, expressive aphasia, and receptive aphasia      DYSARTHRIA  Compensatory strategies (reduced speech rate, over emphasis on articulatory movements, etc) were complete with maximal cuing    DYSPHAGIA  Current Diet Order: ADULT DIET; Dysphagia - Pureed  Results and recommendations of swallowing evaluation reviewed.  Trials of upgraded diet textures to determine the least restrictive PO diet to maintain adequate nutrition/hydration were completed . Recommend diet remain the same    RECEPTIVE LANGUAGE  Pt followed 1 step verbal direction fairly well  Processing time was delayed.        Will continue

## 2024-05-17 NOTE — PLAN OF CARE
Problem: Safety - Adult  Goal: Free from fall injury  Outcome: Progressing     Problem: Chronic Conditions and Co-morbidities  Goal: Patient's chronic conditions and co-morbidity symptoms are monitored and maintained or improved  Outcome: Progressing     Problem: Discharge Planning  Goal: Discharge to home or other facility with appropriate resources  Outcome: Progressing     Problem: Pain  Goal: Verbalizes/displays adequate comfort level or baseline comfort level  Outcome: Progressing     Problem: Skin/Tissue Integrity  Goal: Absence of new skin breakdown  Description: 1.  Monitor for areas of redness and/or skin breakdown  2.  Assess vascular access sites hourly  3.  Every 4-6 hours minimum:  Change oxygen saturation probe site  4.  Every 4-6 hours:  If on nasal continuous positive airway pressure, respiratory therapy assess nares and determine need for appliance change or resting period.  Outcome: Progressing     Problem: ABCDS Injury Assessment  Goal: Absence of physical injury  Outcome: Progressing

## 2024-05-17 NOTE — PROGRESS NOTES
Ms. Pacheco was present with her son and daughter in law her spirits were good although she could not speak much I was to get good assessment from her son. She was  comforted in knowing she had family support.       05/17/24 1510   Encounter Summary   Encounter Overview/Reason Palliative Care   Service Provided For Patient and family together   Referral/Consult From Multi-disciplinary team   Support System Children;Family members   Last Encounter  05/17/24  (TR)   Complexity of Encounter Moderate   Spiritual/Emotional needs   Type Spiritual Support   Assessment/Intervention/Outcome   Assessment Calm;Peaceful   Intervention Active listening;Discussed belief system/Zoroastrian practices/fatuma;Discussed relationship with God   Outcome Peace;Engaged in conversation;Comfort

## 2024-05-17 NOTE — PROGRESS NOTES
Message sent to CALOS Combs regarding patient's family requesting medication for patient's agitation prior to discharge this evening.

## 2024-05-17 NOTE — PROGRESS NOTES
Comprehensive Nutrition Assessment    Type and Reason for Visit:  Initial, RD Nutrition Re-Screen/LOS    Nutrition Recommendations/Plan:   Continue current diet  Pt/family declined ONS options at this time     Malnutrition Assessment:  Malnutrition Status:  At risk for malnutrition (Comment) (05/17/24 6887)    Context:  Acute Illness     Findings of the 6 clinical characteristics of malnutrition:  Energy Intake:  50% or less of estimated energy requirements for 5 or more days  Weight Loss:  Unable to assess (ARTIS d/t limited EMR data)     Body Fat Loss:  No significant body fat loss     Muscle Mass Loss:  Mild muscle mass loss Temples (temporalis)  Fluid Accumulation:  No significant fluid accumulation     Strength:  Not Performed    Nutrition Assessment:    Pt tx from [facility] presenting w/ stroke like symptoms/AMS; adm d/t CVA; Noted aphasia. Limited PMHx of dementia. Pureed solids w/ thin liquids w/ small sips per SLP s/p MBSS 5/14 noted. Pt not interested in ONS at this time. Will monitor.    Nutrition Related Findings:    -I/O, A&Ox2, hypoactive BS, abd soft, hyperglycemia, A1c 5.7, +2 edema, dysphagia Wound Type: None       Current Nutrition Intake & Therapies:    Average Meal Intake: 1-25% (No intakes per doc flow - nursing reports poor intake)  Average Supplements Intake: None Ordered  ADULT DIET; Dysphagia - Pureed    Anthropometric Measures:  Height: 157.5 cm (5' 2.01\")  Ideal Body Weight (IBW): 110 lbs (50 kg)    Admission Body Weight: 70.3 kg (155 lb) (5/12 first measured; bed scale)  Current Body Weight: 70.3 kg (155 lb) (5/15 bed scale), 140.9 % IBW. Weight Source: Bed Scale  Current BMI (kg/m2): 28.3  Usual Body Weight: 68 kg (150 lb) (5/27/23; Limited wt hx per EMR)  % Weight Change (Calculated): 3.3  Weight Adjustment For: No Adjustment                 BMI Categories: Overweight (BMI 25.0-29.9)    Estimated Daily Nutrient Needs:  Energy Requirements Based On: Formula  Weight Used for Energy

## 2024-05-17 NOTE — PROGRESS NOTES
Monique Pacheco is a 89 y.o. right handed female     Patient was transferred to [facility] for an acute stroke found to be lacunar in her left hemisphere.    Increasing confusion and right hemiparesis.  She was not a candidate for TNK or endovascular treatment.    MRI of the brain did demonstrate a left corpus stratum as well as a few microhemorrhages which could reflect cerebral amyloidosis.    Neurology was reconsulted requesting echo with bubble to be completed  This was completed showing no intracardiac shunting.    She continues to have dysarthria as well as right-sided weakness    There is no family present this morning      Allergies as of 05/12/2024    (No Known Allergies)       Objective:     BP (!) 140/52   Pulse 83   Temp 97.6 °F (36.4 °C) (Temporal)   Resp 18   Ht 1.575 m (5' 2.01\")   Wt 70.3 kg (155 lb)   SpO2 92%   BMI 28.34 kg/m²      General appearance: alert, appears stated age and cooperative  Head: Normocephalic, without obvious abnormality, atraumatic  Extremities: no cyanosis or edema  Pulses: 2+ and symmetric  Skin: no rashes or lesions    Mental Status: Alert, oriented, thought content appropriate    Speech: clear  Language: appropriate    Cranial Nerves:  I: smell    II: visual acuity     II: visual fields Full   II: pupils PATTI   III,VII: ptosis None   III,IV,VI: extraocular muscles  EOMI without nystagmus    V: mastication Normal   V: facial light touch sensation  Normal   V,VII: corneal reflex  Present   VII: facial muscle function - upper     VII: facial muscle function - lower Normal   VIII: hearing Normal   IX: soft palate elevation  Normal   IX,X: gag reflex    XI: trapezius strength     XI: sternocleidomastoid strength    XI: neck extension strength     XII: tongue strength  Normal     Motor:  Right hemiparesis arm more involved in the    Sensory:  Appreciates tuning fork in all limb    Coordination:   Decreased right relative to weakness    No Babinski  No Matias's

## 2024-05-17 NOTE — CARE COORDINATION
Here for Acute lacunar infarct involving the left corpus striatum. No acute  hemorrhage or significant mass effect.. Discharge plan - [facility] - have precert today  Message to attending to check discharge.  Envelope ambulance form and PASRR completed and in soft chart cm/sw to follow.Electronically signed by Shanna Bravo RN on 5/17/2024 at 12:36 PM    Discharge order noted- [facility] ambulance transport set up for 530 pm patient, son and daughter in law in room all ntfd as well as [facility] with [facility].Electronically signed by Shanna Bravo RN on 5/17/2024 at 2:02 PM

## 2024-05-17 NOTE — PROGRESS NOTES
Chart reviewed. Update received from nursing and case management. Patient for tentative discharge to facility today. Per previous palliative medicine discussion family would like to continue DNR CCA CODE STATUS and all other medical management.  Goals of care and CODE STATUS have been established. There are no further PM needs at this time.  PM will now sign off.  If new PM needs arise, please re-consult.  Thank you.    Shy Villegas, SUSANA - CNP  Palliative Medicine

## 2024-05-19 ENCOUNTER — APPOINTMENT (OUTPATIENT)
Dept: ULTRASOUND IMAGING | Age: 89
End: 2024-05-19
Payer: MEDICARE

## 2024-05-19 ENCOUNTER — HOSPITAL ENCOUNTER (EMERGENCY)
Age: 89
Discharge: NURSING, REHAB OR LONG-TERM CARE | End: 2024-05-19
Attending: EMERGENCY MEDICINE
Payer: MEDICARE

## 2024-05-19 ENCOUNTER — APPOINTMENT (OUTPATIENT)
Dept: GENERAL RADIOLOGY | Age: 89
End: 2024-05-19
Payer: MEDICARE

## 2024-05-19 VITALS
HEART RATE: 79 BPM | TEMPERATURE: 98.6 F | SYSTOLIC BLOOD PRESSURE: 147 MMHG | OXYGEN SATURATION: 99 % | RESPIRATION RATE: 14 BRPM | HEIGHT: 60 IN | BODY MASS INDEX: 30.43 KG/M2 | DIASTOLIC BLOOD PRESSURE: 69 MMHG | WEIGHT: 155 LBS

## 2024-05-19 DIAGNOSIS — M79.604 PAIN OF RIGHT LOWER EXTREMITY: Primary | ICD-10-CM

## 2024-05-19 PROCEDURE — 73562 X-RAY EXAM OF KNEE 3: CPT

## 2024-05-19 PROCEDURE — 73610 X-RAY EXAM OF ANKLE: CPT

## 2024-05-19 PROCEDURE — 93971 EXTREMITY STUDY: CPT

## 2024-05-19 PROCEDURE — 99284 EMERGENCY DEPT VISIT MOD MDM: CPT

## 2024-05-19 NOTE — ED PROVIDER NOTES
[facility] EMERGENCY DEPARTMENT  EMERGENCY DEPARTMENT ENCOUNTER        Pt Name: Monique Pacheco  MRN: [id number]  Birthdate 4/7/1932  Date of evaluation: 5/19/2024  Provider: Precious Boyer DO  PCP: Corazon King DO  Note Started: 3:39 PM EDT 5/19/24    CHIEF COMPLAINT       Chief Complaint   Patient presents with    Joint Swelling     Son wanted her sent in for R knee and hand edema, and no movement with R extremities- had a stroke last week       HISTORY OF PRESENT ILLNESS: 1 or more Elements       Monique Pacheco is a 89 y.o. female who presents to the emergency department with a chief complaint of right knee and ankle pain.  The history is obtained from the patient as well as the patient's son and daughter.  The patient was recently admitted had a stroke and does have right-sided hemiparesis secondary to the stroke.  The patient had right knee and ankle pain.  The patient states that she was on the bedpan 3 times today.  The patient had pain after getting on the bedpan.  She denies any pain at the current time.  The pain is located in the right knee as well as posterior to the right knee and in the right ankle.  The pain is described as aching.  It is worse with palpation.  The patient denies any treatment prior to arrival.  The patient denies any new numbness or weakness aside her new baseline.  Patient has no other complaints and is pain-free at the current time.    Nursing Notes were all reviewed and agreed with or any disagreements were addressed in the HPI.    REVIEW OF SYSTEMS :      Review of Systems   Constitutional:  Negative for chills and fatigue.   HENT:  Negative for congestion.    Eyes:  Negative for redness.   Respiratory:  Negative for shortness of breath.    Cardiovascular:  Negative for chest pain.   Gastrointestinal:  Negative for abdominal pain, nausea and vomiting.   Genitourinary:  Negative for dysuria.   Musculoskeletal:  Positive for

## 2024-05-20 NOTE — ED NOTES
I went in the room and checked patients bed and brief and both were dry. I then put patient on the bedpan and she urinated.

## 2024-05-23 NOTE — DISCHARGE SUMMARY
5/19/2024  EXAMINATION: THREE XRAY VIEWS OF THE RIGHT KNEE 5/19/2024 3:53 pm COMPARISON: None. HISTORY: ORDERING SYSTEM PROVIDED HISTORY: pain TECHNOLOGIST PROVIDED HISTORY: Reason for exam:->pain FINDINGS: Osteopenia.  Degenerative arthritis greatest in the lateral joint compartment.  No radiographic evidence for fracture.  Small effusion. Atherosclerotic vascular calcifications.     No acute osseous findings. RECOMMENDATION: Short-term follow-up radiographs in 7-10 days or cross-sectional imaging (CT/MRI) if there is persistent concern for fracture or the symptoms persist.       Discharge Exam:    HEENT: NCAT,  PERRLA, No JVD  Heart:  RRR, no murmurs, gallops, or rubs.  Lungs:  CTA bilaterally, no wheeze, rales or rhonchi  Abd: bowel sounds present, nontender, nondistended, no masses  Extrem:  No clubbing, cyanosis, or edema    Disposition: [facility]     Patient Condition at Discharge: stable     Patient Instructions:      Medication List        START taking these medications      amLODIPine 5 MG tablet  Commonly known as: NORVASC  Take 1 tablet by mouth daily     aspirin 81 MG chewable tablet  Take 1 tablet by mouth daily     atorvastatin 40 MG tablet  Commonly known as: LIPITOR  Take 1 tablet by mouth nightly     clopidogrel 75 MG tablet  Commonly known as: PLAVIX  Take 1 tablet by mouth daily for 17 days            CONTINUE taking these medications      memantine 5 MG tablet  Commonly known as: NAMENDA               Where to Get Your Medications        Information about where to get these medications is not yet available    Ask your nurse or doctor about these medications  amLODIPine 5 MG tablet  aspirin 81 MG chewable tablet  atorvastatin 40 MG tablet  clopidogrel 75 MG tablet       Activity: activity as tolerated  Diet: regular diet    Pt has been advised to:    Follow-up with Corazon King DO in 1 week.  Follow-up with consultants as recommended by them    Note that over 30 minutes was spent in preparing